# Patient Record
Sex: MALE | Race: WHITE
[De-identification: names, ages, dates, MRNs, and addresses within clinical notes are randomized per-mention and may not be internally consistent; named-entity substitution may affect disease eponyms.]

---

## 2018-12-17 NOTE — RAD
EXAM DESCRIPTION: Chest,2 Views



CLINICAL HISTORY: pre op



COMPARISON: Previous study October 8, 2013



TECHNIQUE: PA/lateral



FINDINGS: There is no acute appearing cardiac or pulmonary

abnormality. Heart size is normal with normal pulmonary

vascularity. No pleural effusion or pneumothorax. Lungs are clear

with no consolidating infiltrate. Lateral view shows intact

sternum and old wedging in the lower T-spine.



IMPRESSION:



No acute process is identified in the chest.



Electronically signed by:  Bradford Ramirez MD  12/17/2018 12:38

[facility] Workstation: [id number]

## 2018-12-21 ENCOUNTER — HOSPITAL ENCOUNTER (OUTPATIENT)
Dept: HOSPITAL 39 - AMB | Age: 73
Discharge: HOME | End: 2018-12-21
Attending: SURGERY
Payer: COMMERCIAL

## 2018-12-21 VITALS — TEMPERATURE: 96.7 F | OXYGEN SATURATION: 98 %

## 2018-12-21 VITALS — SYSTOLIC BLOOD PRESSURE: 158 MMHG | DIASTOLIC BLOOD PRESSURE: 80 MMHG

## 2018-12-21 DIAGNOSIS — K92.1: Primary | ICD-10-CM

## 2018-12-21 DIAGNOSIS — Z79.82: ICD-10-CM

## 2018-12-21 DIAGNOSIS — I10: ICD-10-CM

## 2018-12-21 DIAGNOSIS — K64.8: ICD-10-CM

## 2018-12-21 DIAGNOSIS — Z96.652: ICD-10-CM

## 2018-12-21 DIAGNOSIS — Z79.899: ICD-10-CM

## 2018-12-21 PROCEDURE — 46250 REMOVE EXT HEM GROUPS 2+: CPT

## 2018-12-21 PROCEDURE — 81001 URINALYSIS AUTO W/SCOPE: CPT

## 2018-12-21 PROCEDURE — 80053 COMPREHEN METABOLIC PANEL: CPT

## 2018-12-21 PROCEDURE — 45300 PROCTOSIGMOIDOSCOPY DX: CPT

## 2018-12-21 PROCEDURE — 93005 ELECTROCARDIOGRAM TRACING: CPT

## 2018-12-21 PROCEDURE — 36415 COLL VENOUS BLD VENIPUNCTURE: CPT

## 2018-12-21 PROCEDURE — 71046 X-RAY EXAM CHEST 2 VIEWS: CPT

## 2018-12-21 PROCEDURE — 85025 COMPLETE CBC W/AUTO DIFF WBC: CPT

## 2018-12-21 PROCEDURE — 00902 ANES ANORECTAL PX: CPT

## 2018-12-21 RX ADMIN — SODIUM CHLORIDE, POTASSIUM CHLORIDE, SODIUM LACTATE AND CALCIUM CHLORIDE ONE MLS: 600; 310; 30; 20 INJECTION, SOLUTION INTRAVENOUS at 10:05

## 2018-12-21 RX ADMIN — SODIUM CHLORIDE, POTASSIUM CHLORIDE, SODIUM LACTATE AND CALCIUM CHLORIDE ONE MLS: 600; 310; 30; 20 INJECTION, SOLUTION INTRAVENOUS at 12:24

## 2019-07-10 ENCOUNTER — HOSPITAL ENCOUNTER (OUTPATIENT)
Dept: HOSPITAL 39 - MRI | Age: 74
End: 2019-07-10
Attending: FAMILY MEDICINE
Payer: COMMERCIAL

## 2019-07-10 DIAGNOSIS — L03.116: Primary | ICD-10-CM

## 2019-07-10 NOTE — MRI
Study: MRI of the Left Foot. 



Indication: LEFT FOOT PAIN



Technique: Multiplanar, multi sequence MRI of the left foot was

obtained without intravenous contrast. 



Comparison: None.



Findings:



No acute fracture. Lisfranc ligament intact.  Scattered mild

osteoarthritis throughout the midfoot. Moderate osteoarthritis

first MTP joint. Cellulitis throughout the forefoot and most

pronounced at the second and third digits. A tiny subcutaneous

fluid collection noted along the lateral margin of the base of

the second digit anteriorly and measures up to 6.5 mm

craniocaudal by 4 mm transverse by 11 mm AP concerning for a tiny

abscess. No features of osteomyelitis.



Impression:



Cellulitis of the forefoot and most pronounced at the second and

third digits with a tiny subcutaneous abscess suspected of the

lateral margin of the base of the second digit. No features of

osteomyelitis identified.



Electronically signed by:  Yan Olvera MD  7/10/2019 1:37 PM CDT

Workstation: [id number]

## 2021-02-07 ENCOUNTER — HOSPITAL ENCOUNTER (INPATIENT)
Dept: HOSPITAL 39 - ER | Age: 76
LOS: 19 days | Discharge: ANOTHER HOSPITAL | DRG: 208 | End: 2021-02-26
Attending: NURSE PRACTITIONER | Admitting: NURSE PRACTITIONER
Payer: COMMERCIAL

## 2021-02-07 DIAGNOSIS — F05: ICD-10-CM

## 2021-02-07 DIAGNOSIS — E80.6: ICD-10-CM

## 2021-02-07 DIAGNOSIS — U07.1: Primary | ICD-10-CM

## 2021-02-07 DIAGNOSIS — I10: ICD-10-CM

## 2021-02-07 DIAGNOSIS — K81.9: ICD-10-CM

## 2021-02-07 DIAGNOSIS — J12.82: ICD-10-CM

## 2021-02-07 DIAGNOSIS — J96.01: ICD-10-CM

## 2021-02-07 DIAGNOSIS — R18.8: ICD-10-CM

## 2021-02-07 PROCEDURE — XW033E5 INTRODUCTION OF REMDESIVIR ANTI-INFECTIVE INTO PERIPHERAL VEIN, PERCUTANEOUS APPROACH, NEW TECHNOLOGY GROUP 5: ICD-10-PCS | Performed by: NURSE PRACTITIONER

## 2021-02-07 RX ADMIN — ALBUTEROL SULFATE SCH PUFF: 90 AEROSOL, METERED RESPIRATORY (INHALATION) at 17:30

## 2021-02-07 RX ADMIN — GUAIFENESIN SCH MG: 600 TABLET, EXTENDED RELEASE ORAL at 20:35

## 2021-02-07 RX ADMIN — ALBUTEROL SULFATE SCH PUFF: 90 AEROSOL, METERED RESPIRATORY (INHALATION) at 20:46

## 2021-02-07 RX ADMIN — ENOXAPARIN SODIUM SCH MG: 40 INJECTION, SOLUTION INTRAVENOUS; SUBCUTANEOUS at 20:35

## 2021-02-07 NOTE — RAD
: 1945.



Technique: Portable AP chest x-ray. Comparison: 2018.



Clinical history: SOB.



Heart size: Normal.

Lungs: Shallow inspiration. Linear densities at the lung bases

consistent with scarring or subsegmental atelectasis. No airspace

consolidation.

Pleura: No pleural effusion. No pneumothorax.

Mediastinum and ye: Unremarkable.

Skeletal: Unremarkable.

Support tubings: None.



Impression:

1.  Basilar atelectasis and shallow inspiration.



Electronically signed by:  Riley Hernandez MD  2021 12:51 PM

[facility] Workstation: [id number]

## 2021-02-07 NOTE — ED.PDOC
History of Present Illness





- General


Chief Complaint: Respiratory Problem


Stated Complaint: shortness of breath,cough, COVID+


Time Seen by Provider: 02/07/21 12:06


Source: patient, RN notes reviewed, Vital Signs reviewed, family


Exam Limitations: no limitations





- History of Present Illness


Initial Comments: 





76 yo male with PMH HTN and COVID+ presents with worsening SOB.  States he began

having cough and congestion on 1/28 and tested positive for COVID on 1/31 and 

treated with a Z pack.  States he has had body aches, fatigue, decreased 

appetite and occasional fever.  His breathing has been more difficult the past 2

days so came to ED for evaluation.  Denies CP or HA.  Reports cough productive 

of white mucous.


Allergies/Adverse Reactions: 


Allergies





NO KNOWN ALLERGY Allergy (Verified 12/22/15 09:21)


   





Home Medications: 


Ambulatory Orders





Amlodipine Besylate  02/07/21 











Review of Systems





- Review of Systems


Constitutional: States: fever, weakness - generalized.  Denies: chills


EENTM: States: nose congestion.  Denies: blurred vision, throat pain


Respiratory: States: cough, short of breath


Cardiology: Denies: chest pain, palpitations, syncope


Gastrointestinal/Abdominal: Denies: abdominal pain, nausea, vomiting


Genitourinary: Denies: dysuria, frequency


Musculoskeletal: States: muscle pain


Skin: Denies: rash


Neurological: Denies: headache, numbness


All other Systems: Reviewed and Negative





Past Medical History (General)





- Patient Medical History


Hx Stroke: No


Hx Cardiac Disorders: No


Hx Congestive Heart Failure: No


Hx Hypertension: Yes


Hx Diabetes: No


Hx MRSA: No





- Vaccination History


Hx Influenza Vaccination: No


Hx Pneumococcal Vaccination: No





- Social History


Hx Tobacco Use: No


Hx Alcohol Use:  - drinks, mostly weekends





Family Medical History





- Family History


  ** Mother


Family History: Unknown





Physical Exam





- Physical Exam


General Appearance: Alert, Comfortable, No apparent distress


Ears, Nose, Throat: normal pharynx


Neck: non-tender, full range of motion, supple


Respiratory: chest non-tender, normal breath sounds, no respiratory distress, no

accessory muscle use


Cardiovascular/Chest: regular rate, rhythm, no edema, no murmur


Gastrointestinal/Abdominal: non tender, soft, no pulsatile mass


Back Exam: no CVA tenderness, no vertebral tenderness


Extremity: normal range of motion, non-tender, normal inspection


Neurologic: no motor/sensory deficits, alert, normal mood/affect, oriented x 3


Skin Exam: normal color, warm/dry





Progress





- Progress


Progress: 





02/07/21 12:48


Lactic 2.8.  Will give small bolus IVF and repeat lactic.  Pt afebrile.  Resting

comfortably in no distress on 4L NC supplemental oxygen.








02/07/21 13:23


Pt has known COVID infection with worsening SOB past few days.  He is hypoxic on

RA and sats 93-94% on 4L NC.  I have d/w hospitalist, [place], and will 

admit for covid pneumonia.





- Results/Orders


Results/Orders: 





EKG- NSR, rate 61, nml intervals, no ST abnormality








CHEST Xray


Impression: 1. Basilar atelectasis and shallow inspiration.





                                        





02/07/21 12:09


Telemetry .ONCE 


Sodium Chloride 0.9% (Flush) [Saline Flush Syringe]   10 ml IV PRN PRN 


EKG Stat 


Pulse Ox Stat 





02/07/21 12:30


B-TYPE NATRIURETIC PEPTIDE/BNP Stat 


C-REACTIVE PROTEIN Stat 


LD-L/LDH Stat 





02/07/21 12:35


BLOOD CULTURE Stat 





02/07/21 12:48


Sodium Chloride 0.9% 1000ML [Ns 1000 ml] 1,000 ml IVS ONCE 





02/07/21 13:21


Azithromycin IV [Zithromax IV] 500 mg   Sodium Chloride 0.9% 250Ml [NS 250ml] 

250 ml IVPB ONCE 


Remdesivir 200 mg   Sodium Chloride 0.9% 250Ml [NS 250ml] 250 ml IVPB ONCE 


cefTRIAXone SODIUM [Rocephin] 1 gm   Sodium Chl 0.9% 50Ml Min-Bag+ [NS 50ml 

MINI-BAG+] 50 ml IVPB ONCE 





02/07/21 14:15


LACTIC ACID Q2H 








                         Laboratory Results - last 24 hr











  02/07/21 02/07/21 02/07/21





  12:30 12:30 12:31


 


WBC   


 


RBC   


 


Hgb   


 


Hct   


 


MCV   


 


MCH   


 


MCHC   


 


RDW   


 


Plt Count   


 


MPV   


 


Absolute Neuts (auto)   


 


Absolute Lymphs (auto)   


 


Absolute Monos (auto)   


 


Absolute Eos (auto)   


 


Absolute Basos (auto)   


 


Neutrophils %   


 


Lymphocytes %   


 


Monocytes %   


 


Eosinophils %   


 


Basophils %   


 


PT   


 


INR   


 


PTT (SP)   


 


Fibrinogen  546 H  


 


D-Dimer, Quantitative   


 


Sodium    138


 


Potassium    4.1


 


Chloride    101


 


Carbon Dioxide    26


 


Anion Gap    15.1


 


BUN    22 H


 


Creatinine    0.84


 


BUN/Creatinine Ratio    26.2 H


 


Random Glucose    95


 


Serum Osmolality    278.8


 


Lactic Acid   


 


Calcium    9.2


 


Total Bilirubin    1.8 H


 


AST    63 H


 


ALT    56


 


Alkaline Phosphatase    86


 


Creatine Kinase    37 L


 


CK-MB (CK-2)    0.9


 


CK-MB (CK-2) %    Not Reportable


 


Troponin I    < 0.02


 


B-Natriuretic Peptide   < 15.0 


 


Serum Total Protein    7.3


 


Albumin    3.4


 


Globulin    3.9 H


 


Albumin/Globulin Ratio    0.9 L














  02/07/21 02/07/21 02/07/21





  12:31 12:31 12:31


 


WBC  7.3  


 


RBC  5.95  


 


Hgb  18.1 H  


 


Hct  54.0 H  


 


MCV  90.7  


 


MCH  30.5  


 


MCHC  33.6  


 


RDW  15.0 H  


 


Plt Count  166  


 


MPV  9.1  


 


Absolute Neuts (auto)  5.40  


 


Absolute Lymphs (auto)  0.90 L  


 


Absolute Monos (auto)  1.00 H  


 


Absolute Eos (auto)  0.00  


 


Absolute Basos (auto)  0.00  


 


Neutrophils %  74.0  


 


Lymphocytes %  11.6 L  


 


Monocytes %  14.2 H  


 


Eosinophils %  0.1 L  


 


Basophils %  0.1  


 


PT   12.2 H 


 


INR   1.23 H 


 


PTT (SP)   28.5 


 


Fibrinogen   


 


D-Dimer, Quantitative   203.0 


 


Sodium   


 


Potassium   


 


Chloride   


 


Carbon Dioxide   


 


Anion Gap   


 


BUN   


 


Creatinine   


 


BUN/Creatinine Ratio   


 


Random Glucose   


 


Serum Osmolality   


 


Lactic Acid    2.8 H*


 


Calcium   


 


Total Bilirubin   


 


AST   


 


ALT   


 


Alkaline Phosphatase   


 


Creatine Kinase   


 


CK-MB (CK-2)   


 


CK-MB (CK-2) %   


 


Troponin I   


 


B-Natriuretic Peptide   


 


Serum Total Protein   


 


Albumin   


 


Globulin   


 


Albumin/Globulin Ratio   














Departure





- Departure


Clinical Impression: 


 Pneumonia due to COVID-19 virus, Hypoxia, Essential hypertension





Time of Disposition: 13:22


Disposition: Admit Patient


Condition: Fair


Departure Forms:  ED Discharge - Pt. Copy, Patient Portal Self Enrollment


Referrals: 


QUINTEN YUAN [Primary Care Provider] - 1-2 Weeks


Home Medications: 


Ambulatory Orders





Amlodipine Besylate  02/07/21 











Decision To Admit





- Decistion To Admit


Decision to Admit Date: 02/07/21


Decision to Admit Time: 13:22

## 2021-02-07 NOTE — HP
SUPERVISING PHYSICIAN:    Elias Benavides MD



CHIEF COMPLAINT:   Cough, shortness of breath, Covid positive.



HISTORY OF PRESENT ILLNESS:  Mr. Kim is a 75 year-old male patient who 
presented to the Emergency Room today with a history of hypertension and 
recently being tested positive for Covid.  He presents with increasing shortness
of breath.  He endorses that his symptoms initially started with a cough and 
congestion on 01/28/21.  He actually tested positive on 1/31/21 and was started 
on a Z-Pac at that time.  He also endorses that he has had body aches, fatigue, 
decreased appetite and occasional fever and increasing difficulty with breathing
over the last 2 days.  He denied actual chest pain.  His chest x-ray shows 
bilateral atelectasis.  His actual labs showed he had a white count of 7,300 
without a left shift but low lymphocytic count.  His D-dimer was normal but he 
had a lactic acid of 2.8.  Creatinine was 0.84.  C-reactive protein is a little 
low at 6.1. Vital signs in the Emergency Room showing saturation 88% on room 
air.  He does not normally wear oxygen.  Respirations of 24, some mild distress.
 After breathing treatment and placing him on oxygen at nasal cannula, he was 
showing 94% saturations.  Given his recent testing positive for Covid and 
increasing shortness of breath and dyspnea, hypoxia, he is going to be admitted 
now for treatment of Covid pneumonitis.  He was admitted in stable condition.



PAST MEDICAL HISTORY: 

1.  Hypertension.



PAST SURGICAL HISTORY:

1.  Left inguinal hernia.

2.  Eye surgery.

3.  Left total knee replacement. 



CURRENT MEDICATIONS:  Awaiting updated list and verification of his medications 
in the electronic medical record.



ALLERGIES:   No known drug allergies.



FAMILY HISTORY:   Positive for CVA, dementia.



SOCIAL HISTORY:  The patient currently works at [employer] in [place], [place].  
He drinks an occasional beer but has no history of smoking.  He is  and 
lives by himself.



REVIEW OF SYSTEMS: 

CONSTITUTIONAL:  Positive for general malaise, weakness, fevers.  Denies any 
actual chills or unintentional weight loss.

HEENT:  Positive for nasal congestion, denies ear ache, headaches. vision 
changes, sore throat.

CHEST:   Positive for cough and increasing shortness of breath.  

HEART:   Denies chest pain, palpitations, or syncopal episodes.  

ABDOMEN:   Negative for nausea, vomiting, diarrhea or constipation or abdominal 
pains.

GENITOURINARY:  Denies dysuria, hematuria or polyuria.

MUSCULOSKELETAL:  General arthralgias, no joint swelling.

SKIN:  Denies lesions, rashes, moles or unexplained changes.

NEUROLOGIC:  Denies ataxia, seizures, paraesthesias, headaches or other focal 
neurological deficits.

HEMATOLOGICAL:   Denies unexplained bleeding, easy bruising or transfusion 
reactions.



PHYSICAL EXAMINATION: 



VITAL SIGNS:  Temperature 98.3, pulse 74, blood pressure 150/84, respirations 24
to 26, oxygen saturation 88% on room air. After breathing treatment and placing 
on oxygen 4 liter nasal cannula, he was showing 94% saturations.



GENERAL:  The patient looks to be in no acute distress.  He is resting 
comfortably.  He is alert.



HEENT:  Tympanic membranes are clear bilaterally.  Oropharynx is pink, moist, no
lesions.



NECK:  Supple, non-tender, full range of motion.  No jugular venous distention.



CHEST:  Fairly clear, just diminished toward the bases, no obvious rhonchi, 
rales, or wheezes.



CARDIOVASCULAR:  Regular rate and rhythm without appreciable murmurs, rubs, or 
gallops.



ABDOMEN:   Soft, non-tender, positive bowel sounds



BACK:  No CVA tenderness, no vertebral tenderness.



EXTREMITIES:   Without cyanosis, clubbing, or edema.



NEUROLOGIC:  He is alert and oriented x 3.  Cranial nerves II through XII are 
grossly intact.



SKIN:  Warm, pink and dry.



RECTAL:   Exam deferred. 



LABORATORY:   White count 7,300, hemoglobin 18.1, hematocrit 54.0.  Platelet 
count 166,000.  Differential does show to be without a left shift but a low 
lymphocytic count.  Coagulation studies showed normal PTT.  D-dimer normal at 
203.  Fibrinogen slightly elevated at 546.  Chemistries show normal 
electrolytes, creatinine 0.4.  Initial lactic acid 2.8.  After a liter of fluid 
and repeat 2 hours, was 1.8.  Bilirubin slightly elevated at 1.8. AST up to 63, 
ALT normal, alkaline phosphatase normal.  LDH elevated at 292.  C-reactive 
protein 6.1.  Troponin less than 0.02.



MICROBIOLOGY:  Blood cultures are pending.



RADIOLOGY:  

12-lead EKG shows a normal sinus rhythm at 61 beats-per-minute with no ST or T-
wave changes to indicate acute ischemia.  



Chest x-ray showed bibasilar atelectasis and shallow inspiration.



ASSESSMENT: 

1.  Covid pneumonitis with associated hypoxia.

2.  Sepsis secondary to #1 with initial elevated lactic acid.

3.  Hypertension.



PLAN:   Mr. Kim is going to be admitted for treatment of Covid pneumonitis. 
He will be treated with Rocephin, azithromycin,  Remdesivir, Decadron.  He will 
be started on Protonix, Align and Mucinex and Lovenox for DVT prophylaxis.  He 
will have oxygen and keep his oxygen saturation at 94% or greater.  We will work
to titrate him down to room air as soon as possible.  He will have scheduled 
incentive spirometry with aggressive pulmonary hygiene, albuterol for breathing 
treatments scheduled and as needed.  I would anticipate his length of stay to be
at least 2 to 3 days. We will follow his labs and x-rays as per protocol.  Until
we can transition patient to outpatient management, we will continue to monitor 
and treat as needed



#[id number]

MTDD

## 2021-02-08 RX ADMIN — ALBUTEROL SULFATE SCH PUFF: 90 AEROSOL, METERED RESPIRATORY (INHALATION) at 20:50

## 2021-02-08 RX ADMIN — ALUMINUM ZIRCONIUM TRICHLOROHYDREX GLY SCH MG: 0.2 STICK TOPICAL at 08:52

## 2021-02-08 RX ADMIN — ALBUTEROL SULFATE SCH PUFF: 90 AEROSOL, METERED RESPIRATORY (INHALATION) at 08:40

## 2021-02-08 RX ADMIN — DEXAMETHASONE SODIUM PHOSPHATE SCH MG: 10 INJECTION INTRAMUSCULAR; INTRAVENOUS at 09:06

## 2021-02-08 RX ADMIN — ALBUTEROL SULFATE SCH PUFF: 90 AEROSOL, METERED RESPIRATORY (INHALATION) at 12:07

## 2021-02-08 RX ADMIN — CEFTRIAXONE SCH MLS/HR: 1 INJECTION, POWDER, FOR SOLUTION INTRAMUSCULAR; INTRAVENOUS at 19:26

## 2021-02-08 RX ADMIN — REMDESIVIR SCH MLS/HR: 5 INJECTION INTRAVENOUS at 11:27

## 2021-02-08 RX ADMIN — GUAIFENESIN SCH MG: 600 TABLET, EXTENDED RELEASE ORAL at 08:52

## 2021-02-08 RX ADMIN — GUAIFENESIN SCH MG: 600 TABLET, EXTENDED RELEASE ORAL at 20:08

## 2021-02-08 RX ADMIN — ENOXAPARIN SODIUM SCH MG: 40 INJECTION, SOLUTION INTRAVENOUS; SUBCUTANEOUS at 20:08

## 2021-02-08 RX ADMIN — AZITHROMYCIN DIHYDRATE SCH MLS/HR: 500 INJECTION, POWDER, LYOPHILIZED, FOR SOLUTION INTRAVENOUS at 09:06

## 2021-02-08 RX ADMIN — CEFTRIAXONE SCH MLS/HR: 1 INJECTION, POWDER, FOR SOLUTION INTRAMUSCULAR; INTRAVENOUS at 09:06

## 2021-02-08 RX ADMIN — ALBUTEROL SULFATE SCH PUFF: 90 AEROSOL, METERED RESPIRATORY (INHALATION) at 16:35

## 2021-02-08 NOTE — RAD
PROCEDURE:XR CHEST 1 VIEW



HISTORY:COVID 



COMPARISON:



February 7, 2021



FINDINGS:



The heart appears unremarkable. The patient again has taken a

poor inspiratory effort. There is some crowding the pulmonary

vascularity. There is no effusion or pneumothorax. There are

atelectatic changes seen at the left lung base. There are no

acute bony or soft tissue abnormalities.



IMPRESSION:



Stable chest x-ray.



Electronically signed by:  Rhys Bianchi MD  2/8/2021 6:23 AM CST

Workstation: [id number]

## 2021-02-08 NOTE — PN
SUPERVISING PHYSICIAN:  Brice Louise MD



DATE:  02/08/21



SUBJECTIVE:  The patient is not feeling any shortness of breath.  He does have a
little bit of increased shortness of breath as he walks around the room.  He 
does have a productive cough with white sputum.  



OBJECTIVE:  

VITAL SIGNS:  Blood pressure 116/67, heart rate 54, respiratory rate 18, 
temperature 97.2, oxygen saturation 95% on 5 liters via nasal cannula.

GENERAL:  Mr. Kim is a 75-year-old male patient who is in no active distress
currently.  

NEUROLOGIC:  The patient is alert.  

LUNGS:  Diminished with some bibasilar rales.

CARDIOVASCULAR:  Regular rate and rhythm.  Normal S1, S2.

ABDOMEN:  Soft.  Positive bowel sounds.  

EXTREMITIES: Lower extremities with no significant edema.  



LABORATORY:  White count 2.8, hemoglobin 15.5, platelet count 125.  D-dimer less
than 131.  Chemistry with mildly elevated BUN at 20.  Glucose 162.



RADIOLOGY:  Chest x-ray shows no significant change from yesterday.



ASSESSMENT: 

1.  Acute hypoxic respiratory failure secondary to COVID pneumonitis.

2.  COVID pneumonitis.

3.  Sepsis secondary to #1 with improved lactic acid.

4.  Hypertension.



PLAN:  We will continue the remdesivir, empiric antibiotics as well as 
dexamethasone.  Scheduled bronchodilator therapy as well as bronchial hygiene 
has been ordered as well.  We will continue to watch labs and x-rays as needed.



#[id number]

MTDD

## 2021-02-09 RX ADMIN — PROMETHAZINE HYDROCHLORIDE AND CODEINE PHOSPHATE PRN ML: 6.25; 1 SOLUTION ORAL at 20:55

## 2021-02-09 RX ADMIN — CEFTRIAXONE SCH MLS/HR: 1 INJECTION, POWDER, FOR SOLUTION INTRAMUSCULAR; INTRAVENOUS at 09:03

## 2021-02-09 RX ADMIN — DEXAMETHASONE SODIUM PHOSPHATE SCH MG: 10 INJECTION INTRAMUSCULAR; INTRAVENOUS at 09:03

## 2021-02-09 RX ADMIN — REMDESIVIR SCH MLS/HR: 5 INJECTION INTRAVENOUS at 11:17

## 2021-02-09 RX ADMIN — GUAIFENESIN SCH MG: 600 TABLET, EXTENDED RELEASE ORAL at 20:55

## 2021-02-09 RX ADMIN — ALBUTEROL SULFATE SCH PUFF: 90 AEROSOL, METERED RESPIRATORY (INHALATION) at 16:30

## 2021-02-09 RX ADMIN — PANTOPRAZOLE SODIUM SCH MG: 40 TABLET, DELAYED RELEASE ORAL at 05:40

## 2021-02-09 RX ADMIN — ALBUTEROL SULFATE SCH PUFF: 90 AEROSOL, METERED RESPIRATORY (INHALATION) at 08:15

## 2021-02-09 RX ADMIN — ALBUTEROL SULFATE SCH PUFF: 90 AEROSOL, METERED RESPIRATORY (INHALATION) at 12:30

## 2021-02-09 RX ADMIN — ALUMINUM ZIRCONIUM TRICHLOROHYDREX GLY SCH MG: 0.2 STICK TOPICAL at 09:04

## 2021-02-09 RX ADMIN — AZITHROMYCIN DIHYDRATE SCH MLS/HR: 500 INJECTION, POWDER, LYOPHILIZED, FOR SOLUTION INTRAVENOUS at 09:04

## 2021-02-09 RX ADMIN — GUAIFENESIN SCH MG: 600 TABLET, EXTENDED RELEASE ORAL at 09:04

## 2021-02-09 RX ADMIN — DEXAMETHASONE SODIUM PHOSPHATE SCH MG: 10 INJECTION INTRAMUSCULAR; INTRAVENOUS at 20:54

## 2021-02-09 RX ADMIN — ENOXAPARIN SODIUM SCH MG: 40 INJECTION, SOLUTION INTRAVENOUS; SUBCUTANEOUS at 20:54

## 2021-02-09 RX ADMIN — ALBUTEROL SULFATE SCH PUFF: 90 AEROSOL, METERED RESPIRATORY (INHALATION) at 20:48

## 2021-02-09 RX ADMIN — CEFTRIAXONE SCH MLS/HR: 1 INJECTION, POWDER, FOR SOLUTION INTRAMUSCULAR; INTRAVENOUS at 20:53

## 2021-02-09 RX ADMIN — ACETAMINOPHEN PRN MG: 325 TABLET ORAL at 09:30

## 2021-02-09 NOTE — PN
SUPERVISING PHYSICIAN:  Brice Louise MD



DATE:  02/09/21



SUBJECTIVE:  The patient states he feels okay.  He gets a little bit short of 
breath with movement, but overall slept pretty decently las night.



OBJECTIVE:  

VITAL SIGNS:  Blood pressure 133/71, heart rate 60, respiratory rate 18, 
temperature 97.6, oxygen saturation 91% on 7 liters via nasal cannula.

GENERAL:  Mr. Kim is a 75-year-old male patient who is in no active distress
currently.  

NEUROLOGIC:  The patient is alert and oriented.

LUNGS:  Diminished.

CARDIOVASCULAR:  Regular rate and rhythm.  Normal S1, S2.

ABDOMEN:  Soft.  Positive bowel sounds.  

EXTREMITIES: Lower extremities with no edema.  



LABORATORY:  White count 7.7, hemoglobin 15.0, platelet count 133.  D-dimer less
than 131.  Chemistry with CRP down to 1.8.



RADIOLOGY:  Chest x-ray with mild diffuse increase in opacities.



ASSESSMENT: 

1.  Acute hypoxemic respiratory failure.

2.  COVID pneumonitis.

3.  Hypertension.



PLAN:  We will continue the remdesivir, empiric antibiotics as well as 
dexamethasone.  I am actually going to increase the dexamethasone to b.i.d. due 
to increased O2 requirements.  We will continue bronchodilator therapy as well 
as bronchial hygiene.  We will continue to monitor labs and x-rays as needed.



#[id number]

[facility]

## 2021-02-09 NOTE — RAD
EXAM DESCRIPTION:

Chest,1 View



CLINICAL HISTORY:

75 years Male, COVID



COMPARISON:

February 8, 2021



TECHNIQUE:

AP portable chest.



FINDINGS:

 

Extremely small lung volumes with worsening peripheral hazy

infiltrates with relative sparing of the infrahilar and perihilar

region on the right noted. Definite deterioration from portable

previous day's study noted. No associated pleural effusions.

Heart size normal allowing for poor inspiration. Tortuous aorta.

An area of more dense consolidation at the lateral left lung base

is cleared since previous study.



IMPRESSION:

Overall deterioration of the chest with hazy peripheral

infiltrates typical of a Covid pneumonitis with relative sparing

of the central right chest. An area of more dense consolidation

at the lateral left lung base has cleared since previous day's

study.



Electronically signed by:  Geoff Felix MD  2/9/2021 7:32 AM

[facility] Workstation: [id number]

## 2021-02-10 RX ADMIN — ALBUTEROL SULFATE SCH PUFF: 90 AEROSOL, METERED RESPIRATORY (INHALATION) at 08:00

## 2021-02-10 RX ADMIN — CEFTRIAXONE SCH MLS/HR: 1 INJECTION, POWDER, FOR SOLUTION INTRAMUSCULAR; INTRAVENOUS at 09:21

## 2021-02-10 RX ADMIN — PANTOPRAZOLE SODIUM SCH MG: 40 TABLET, DELAYED RELEASE ORAL at 05:37

## 2021-02-10 RX ADMIN — GUAIFENESIN SCH MG: 600 TABLET, EXTENDED RELEASE ORAL at 09:21

## 2021-02-10 RX ADMIN — ENOXAPARIN SODIUM SCH MG: 40 INJECTION, SOLUTION INTRAVENOUS; SUBCUTANEOUS at 20:24

## 2021-02-10 RX ADMIN — REMDESIVIR SCH MLS/HR: 5 INJECTION INTRAVENOUS at 12:08

## 2021-02-10 RX ADMIN — DEXAMETHASONE SODIUM PHOSPHATE SCH MG: 10 INJECTION INTRAMUSCULAR; INTRAVENOUS at 20:24

## 2021-02-10 RX ADMIN — CEFTRIAXONE SCH MLS/HR: 1 INJECTION, POWDER, FOR SOLUTION INTRAMUSCULAR; INTRAVENOUS at 19:46

## 2021-02-10 RX ADMIN — ALBUTEROL SULFATE SCH: 90 AEROSOL, METERED RESPIRATORY (INHALATION) at 13:20

## 2021-02-10 RX ADMIN — ALUMINUM ZIRCONIUM TRICHLOROHYDREX GLY SCH MG: 0.2 STICK TOPICAL at 09:21

## 2021-02-10 RX ADMIN — ALBUTEROL SULFATE SCH PUFF: 90 AEROSOL, METERED RESPIRATORY (INHALATION) at 12:50

## 2021-02-10 RX ADMIN — GUAIFENESIN SCH MG: 600 TABLET, EXTENDED RELEASE ORAL at 20:24

## 2021-02-10 RX ADMIN — AZITHROMYCIN DIHYDRATE SCH MLS/HR: 500 INJECTION, POWDER, LYOPHILIZED, FOR SOLUTION INTRAVENOUS at 09:21

## 2021-02-10 RX ADMIN — DEXAMETHASONE SODIUM PHOSPHATE SCH MG: 10 INJECTION INTRAMUSCULAR; INTRAVENOUS at 09:22

## 2021-02-10 RX ADMIN — ALBUTEROL SULFATE SCH PUFF: 90 AEROSOL, METERED RESPIRATORY (INHALATION) at 19:10

## 2021-02-10 NOTE — RAD
PROCEDURE:XR CHEST 1 VIEW



HISTORY:COVID 



COMPARISON:



February 9, 2021



FINDINGS:



The heart is stable in appearance.. There are stable infiltrates.

There are no new infiltrates. There is no evidence for effusion

or pneumothorax. There are no acute bony or soft tissue

abnormalities.



IMPRESSION:



Stable chest x-ray.



Electronically signed by:  Rhys Bianchi MD  2/10/2021 7:12 AM CST

Workstation: [id number]

## 2021-02-10 NOTE — PN
SUPERVISING PHYSICIAN:  Brice Louise MD



DATE:  02/10/21



SUBJECTIVE:  The patient is resting on his left side.  He does not appear to be 
in any distress.  He is just mildly tachypneic.  Otherwise, he is doing okay.  
He has not had any chest pain.  He says his shortness of breath is still there, 
but not as bad.  



OBJECTIVE:  

VITAL SIGNS:  Temperature 98.6, pulse 52, blood pressure 143/83, respirations 
16, saturation anywhere from 89% to 94% on high flow nasal cannula anywhere from
5 to 8 liters.  

GENERAL:  The patient is resting comfortably on his left side.  He does not 
appear to be in any distress.  He is alert.

CHEST:  Right lung is fairly clear, just diminished toward the base.  Left lung 
has fairly prominent rhonchi heard, more so in upper lung field, more prominent 
on lateral aspect.  No obvious wheezing is noted.  No rales.

HEART:  Regular rate and rhythm.  

ABDOMEN:  Soft, nontender.  Positive bowel sounds. 

EXTREMITIES:  No edema.  

NEUROLOGIC: Alert and oriented times three.  



LABORATORY:  CBC fairly stable with white count 6,500, hemoglobin 15.1, 
hematocrit 45.0, platelet count 135,000.  Differential continues to show a left 
shift.  Coagulation studies show D-dimer normalized now to less than 131.  
Chemistries show creatinine 0.5.  His last C-reactive protein was 1.8.  



RADIOLOGY:  Repeat chest x-ray this morning per radiologic interpretation of 
single view chest shows x-rays to be stable with infiltrates noted, no new 
infiltrates, no effusion or pneumothorax.



ASSESSMENT: 

1.  Acute hypoxemic respiratory failure.

2.  COVID pneumonitis.

3.  Hypertension.



PLAN:  We will continue with current plan of care with antibiotics, remdesivir 
and Decadron.  We will continue to titrate his oxygen as he tolerates to lower 
flow.  He does remain on Decadron at b.i.d. which seems to be helping.  I have 
encouraged him to self prone if possible and to lay on his side to help with 
oxygenation.  Again, hopefully we will be able to get his oxygen needs down to 
at least 4 liters stably.  Until that point, we will continue to monitor and 
treat as needed.  



#[id number]

[facility]

## 2021-02-11 RX ADMIN — CEFTRIAXONE SCH MLS/HR: 1 INJECTION, POWDER, FOR SOLUTION INTRAMUSCULAR; INTRAVENOUS at 20:04

## 2021-02-11 RX ADMIN — ALBUTEROL SULFATE SCH PUFF: 90 AEROSOL, METERED RESPIRATORY (INHALATION) at 08:20

## 2021-02-11 RX ADMIN — GUAIFENESIN SCH MG: 600 TABLET, EXTENDED RELEASE ORAL at 20:32

## 2021-02-11 RX ADMIN — DEXAMETHASONE SODIUM PHOSPHATE SCH MG: 10 INJECTION INTRAMUSCULAR; INTRAVENOUS at 20:32

## 2021-02-11 RX ADMIN — AZITHROMYCIN DIHYDRATE SCH MLS/HR: 500 INJECTION, POWDER, LYOPHILIZED, FOR SOLUTION INTRAVENOUS at 09:47

## 2021-02-11 RX ADMIN — CEFTRIAXONE SCH MLS/HR: 1 INJECTION, POWDER, FOR SOLUTION INTRAMUSCULAR; INTRAVENOUS at 09:47

## 2021-02-11 RX ADMIN — REMDESIVIR SCH MLS/HR: 5 INJECTION INTRAVENOUS at 12:16

## 2021-02-11 RX ADMIN — PANTOPRAZOLE SODIUM SCH MG: 40 TABLET, DELAYED RELEASE ORAL at 05:48

## 2021-02-11 RX ADMIN — DEXAMETHASONE SODIUM PHOSPHATE SCH MG: 10 INJECTION INTRAMUSCULAR; INTRAVENOUS at 09:48

## 2021-02-11 RX ADMIN — ENOXAPARIN SODIUM SCH MG: 40 INJECTION, SOLUTION INTRAVENOUS; SUBCUTANEOUS at 20:31

## 2021-02-11 RX ADMIN — ALUMINUM ZIRCONIUM TRICHLOROHYDREX GLY SCH MG: 0.2 STICK TOPICAL at 09:48

## 2021-02-11 RX ADMIN — ALBUTEROL SULFATE SCH PUFF: 90 AEROSOL, METERED RESPIRATORY (INHALATION) at 19:45

## 2021-02-11 RX ADMIN — ALBUTEROL SULFATE SCH PUFF: 90 AEROSOL, METERED RESPIRATORY (INHALATION) at 11:55

## 2021-02-11 RX ADMIN — ALBUTEROL SULFATE SCH PUFF: 90 AEROSOL, METERED RESPIRATORY (INHALATION) at 16:20

## 2021-02-11 RX ADMIN — GUAIFENESIN SCH MG: 600 TABLET, EXTENDED RELEASE ORAL at 09:48

## 2021-02-12 RX ADMIN — ALBUTEROL SULFATE SCH PUFF: 90 AEROSOL, METERED RESPIRATORY (INHALATION) at 09:00

## 2021-02-12 RX ADMIN — PANTOPRAZOLE SODIUM SCH MG: 40 TABLET, DELAYED RELEASE ORAL at 05:56

## 2021-02-12 RX ADMIN — ENOXAPARIN SODIUM SCH MG: 40 INJECTION, SOLUTION INTRAVENOUS; SUBCUTANEOUS at 20:46

## 2021-02-12 RX ADMIN — DEXAMETHASONE SODIUM PHOSPHATE SCH MG: 10 INJECTION INTRAMUSCULAR; INTRAVENOUS at 20:46

## 2021-02-12 RX ADMIN — GUAIFENESIN SCH MG: 600 TABLET, EXTENDED RELEASE ORAL at 20:46

## 2021-02-12 RX ADMIN — CEFTRIAXONE SCH MLS/HR: 1 INJECTION, POWDER, FOR SOLUTION INTRAMUSCULAR; INTRAVENOUS at 08:38

## 2021-02-12 RX ADMIN — ALUMINUM ZIRCONIUM TRICHLOROHYDREX GLY SCH MG: 0.2 STICK TOPICAL at 08:38

## 2021-02-12 RX ADMIN — GUAIFENESIN SCH MG: 600 TABLET, EXTENDED RELEASE ORAL at 08:38

## 2021-02-12 RX ADMIN — ALBUTEROL SULFATE SCH PUFF: 90 AEROSOL, METERED RESPIRATORY (INHALATION) at 13:00

## 2021-02-12 RX ADMIN — PROMETHAZINE HYDROCHLORIDE AND CODEINE PHOSPHATE PRN ML: 6.25; 1 SOLUTION ORAL at 20:10

## 2021-02-12 RX ADMIN — ALBUTEROL SULFATE SCH PUFF: 90 AEROSOL, METERED RESPIRATORY (INHALATION) at 16:45

## 2021-02-12 RX ADMIN — ALBUTEROL SULFATE SCH PUFF: 90 AEROSOL, METERED RESPIRATORY (INHALATION) at 20:37

## 2021-02-12 RX ADMIN — DEXAMETHASONE SODIUM PHOSPHATE SCH MG: 10 INJECTION INTRAMUSCULAR; INTRAVENOUS at 08:38

## 2021-02-12 RX ADMIN — LEVOFLOXACIN SCH MLS/HR: 5 INJECTION, SOLUTION INTRAVENOUS at 10:08

## 2021-02-12 NOTE — PN
SUPERVISING PHYSICIAN:  Brice Louise MD



DATE:  02/11/21



SUBJECTIVE:  The patient has been on the Airvo high flow nasal cannula system 
and seems to be doing okay.  He is still having some obvious shortness of 
breath, but is in no distress.  No chest pains reported.  He has had several 
bowel movements, no abdominal pains.  He does remain afebrile.



OBJECTIVE:  

VITAL SIGNS:  Temperature 97.8, pulse 62, blood pressure 128/73, respirations 18
to 20, saturation 95% on high flow nasal cannula.

GENERAL:  The patient is laying in bed currently utilizing the Airvo high flow 
nasal cannula system.  He is in no distress, he is alert.  

CHEST:  Left side chest continues to have a significant amount of rhonchi heard,
more prominent in upper lobe and to the lateral aspect.  No wheezing or rales 
noted.  Right side sounds fairly clear, just diminished toward the base. 

HEART:  Regular rate and rhythm.  

ABDOMEN:  Soft, nontender.  Positive bowel sounds. 

EXTREMITIES:  No edema.  

NEUROLOGIC: Alert and oriented times three.  



LABORATORY:  No additional lab studies today as his labs have been fairly 
stable.



RADIOLOGY:  No repeat chest x-ray today, that has been stable as well.



ASSESSMENT: 

1.  Acute hypoxemic respiratory failure.

2.  COVID pneumonitis.

3.  Hypertension.



PLAN:  We will continue with Rocephin, dexamethasone, Lovenox.  He has finished 
a full course of azithromycin.  He remains on Protonix and Align.  We will 
continue to do aggressive pulmonary hygiene with albuterol treatments as needed 
and scheduled.  We will continue to work to titrate his oxygen needs down as he 
tolerates.  Until that point, we will continue to monitor and treat as needed 
until he can transition to outpatient management. 



#[id number]

[facility]

## 2021-02-12 NOTE — RAD
EXAM: XR CHEST 1 VIEW



HISTORY: 75 years, Male, COVID PNA



TECHNIQUE: Chest,1 View



COMPARISON: February 10, 2021



FINDINGS:

 

Again seen are bilateral infiltrates with interspersed airspace

opacities greatest in the left perihilar region and left lateral

mid lung. Infectious process suspected.



Stable mediastinal cardiac silhouette.



No effusions.



IMPRESSION:

 

Bilateral infiltrates and airspace opacities,  more prominent in

the left lateral mid lung and left perihilar region.



Electronically signed by:  Carolina White MD  2/12/2021 6:03 AM

[facility] Workstation: [id number]

## 2021-02-12 NOTE — PN
SUPERVISING PHYSICIAN:  Brice Louise MD



DATE:  02/12/21



SUBJECTIVE:  The patient remains on high flow nasal cannula via the Airvo 
system.  he has not made any significant strides to decrease his oxygen needs 
overnight, but he is remaining stable.  He has had no chest pain, no nausea or 
vomiting.  We did discuss his code status and he does wish to continue with a 
full code and intubation if needed.  He remains afebrile.



OBJECTIVE:  

VITAL SIGNS:  Temperature 97.6, pulse 61, blood pressure 135/78, respirations 
18, saturation 90-91% on high flow nasal cannula via Airvo system.  

GENERAL:  The patient is laying in bed currently utilizing the Airvo high flow 
nasal cannula system.  He is in no distress, he is alert.  

CHEST:  Left chest continues to have mild rhonchi heard, not as prominent as 
yesterday, but continues to be more prominent in upper lobe in the lateral 
aspect.  I am not hearing any wheezing or rales.  There seems to be much better 
air movement today compared to yesterday on the right and continues to sound 
fairly clear.

HEART:  Regular rate and rhythm.  

ABDOMEN:  Soft, nontender.  Positive bowel sounds. 

EXTREMITIES:  No edema.  

NEUROLOGIC: Alert and oriented times three.  



LABORATORY:  Chemistries show normal electrolytes.  Creatinine 0.61, calcium 
8.6.  C-reactive protein is now normalized.  



RADIOLOGY:  Repeat chest x-ray today per radiologic interpretation shows 
bilateral infiltrates and interstitial opacities, more prominent in the left 
lateral midlung and left perihilar region.  



ASSESSMENT: 

1.  Acute hypoxemic respiratory failure.

2.  COVID pneumonitis.

3.  Hypertension.



PLAN:  He has finished his antibiotics in the form of Rocephin and azithromycin.
 Given that he still having significant rhonchi and consolidation in the left 
upper lobe and has not made any significant progress in the last 24 hours, I am 
going to escalate his coverage with antibiotics with Levaquin.  We will start 
him on Levaquin 750 mg with anticipation of 5 days.  He remains on Protonix, 
Align, Lovenox and dexamethasone.  He continues to have aggressive pulmonary 
hygiene with albuterol treatments as needed and remains on Airvo system.  We 
will continue to work to titrate his oxygen needs to room as he tolerates, but 
it has been slow progress.  Until the patient can transition to outpatient 
management, we will continue to monitor and treat as needed.  



#[id number]

[facility]

## 2021-02-13 RX ADMIN — GUAIFENESIN SCH MG: 600 TABLET, EXTENDED RELEASE ORAL at 20:38

## 2021-02-13 RX ADMIN — ALBUTEROL SULFATE SCH PUFF: 90 AEROSOL, METERED RESPIRATORY (INHALATION) at 08:30

## 2021-02-13 RX ADMIN — ALBUTEROL SULFATE SCH PUFF: 90 AEROSOL, METERED RESPIRATORY (INHALATION) at 16:25

## 2021-02-13 RX ADMIN — GUAIFENESIN SCH MG: 600 TABLET, EXTENDED RELEASE ORAL at 08:23

## 2021-02-13 RX ADMIN — PROMETHAZINE HYDROCHLORIDE AND CODEINE PHOSPHATE PRN ML: 6.25; 1 SOLUTION ORAL at 21:25

## 2021-02-13 RX ADMIN — ALBUTEROL SULFATE SCH PUFF: 90 AEROSOL, METERED RESPIRATORY (INHALATION) at 20:05

## 2021-02-13 RX ADMIN — ENOXAPARIN SODIUM SCH MG: 40 INJECTION, SOLUTION INTRAVENOUS; SUBCUTANEOUS at 20:38

## 2021-02-13 RX ADMIN — ALBUTEROL SULFATE SCH PUFF: 90 AEROSOL, METERED RESPIRATORY (INHALATION) at 12:48

## 2021-02-13 RX ADMIN — LEVOFLOXACIN SCH MLS/HR: 5 INJECTION, SOLUTION INTRAVENOUS at 09:37

## 2021-02-13 RX ADMIN — PANTOPRAZOLE SODIUM SCH MG: 40 TABLET, DELAYED RELEASE ORAL at 06:05

## 2021-02-13 RX ADMIN — ALUMINUM ZIRCONIUM TRICHLOROHYDREX GLY SCH MG: 0.2 STICK TOPICAL at 08:23

## 2021-02-13 NOTE — PN
SUPERVISING PHYSICIAN:  Brice Louise MD



DATE:  02/13/21



SUBJECTIVE:  The patient seems to be doing fairly well.  He has no major 
complaints.  No chest pain, no nausea.  Still having some shortness of breath, 
especially with ambulation.  He seems to be doing okay on the high flow nasal 
cannula via the Airvo system.  He is actually showing some decrease in need for 
FI02.  Otherwise, he appears to be fairly stable. 



OBJECTIVE:  

VITAL SIGNS:  Temperature 97.6, pulse 49, blood pressure 138/79, respirations 
22, saturation 95% on high flow nasal cannula and FI02 with Airvo system.  

GENERAL:  The patient is laying in bed currently utilizing the Airvo high flow 
nasal cannula system.  He is in no distress, he is alert.  

CHEST:  Left chest continues to have mild rhonchi heard, not as prominent as 
yesterday, but continues to be more prominent in upper lobe in the lateral 
aspect.  I am not hearing any wheezing or rales.  There seems to be much better 
air movement today compared to yesterday on the right and continues to sound 
fairly clear.

HEART:  Regular rate and rhythm.  

ABDOMEN:  Soft, nontender.  Positive bowel sounds. 

EXTREMITIES:  No edema.  

NEUROLOGIC: Alert and oriented times three.  



LABORATORY:  No additional laboratory studies today.  His lab has been fairly 
stable. C-reactive protein is normalized as well as his D-dimer.



MICROBIOLOGY:  Blood cultures remain negative after 5 days. 



RADIOLOGY:  No repeat chest x-ray today as his chest x-ray has been fairly 
stable.



ASSESSMENT: 

1.  Acute hypoxemic respiratory failure.

2.  COVID pneumonitis.

3.  Hypertension.



PLAN:  We will continue current plan of care.  He is now on antibiotic coverage 
with Levaquin.  He has finished a full course of Decadron.  We may go ahead and 
put him back on low-dose prednisone today to see if this will at least assist in
decrease of his oxygen needs.  He remains on Lovenox for DVT prophylaxis, 
Protonix.  We will continue to titrate his oxygen down as possibly and hopefully
get him down to at least lower flow nasal cannula over the next 24-48 ours.  
Until then, we will continue to monitor and treat as needed until we can 
transition him to outpatient management.



#[id number]

[facility]

## 2021-02-14 RX ADMIN — ALBUTEROL SULFATE SCH PUFF: 90 AEROSOL, METERED RESPIRATORY (INHALATION) at 08:40

## 2021-02-14 RX ADMIN — ENOXAPARIN SODIUM SCH MG: 40 INJECTION, SOLUTION INTRAVENOUS; SUBCUTANEOUS at 20:30

## 2021-02-14 RX ADMIN — PANTOPRAZOLE SODIUM SCH MG: 40 TABLET, DELAYED RELEASE ORAL at 06:08

## 2021-02-14 RX ADMIN — ALBUTEROL SULFATE SCH PUFF: 90 AEROSOL, METERED RESPIRATORY (INHALATION) at 11:50

## 2021-02-14 RX ADMIN — GUAIFENESIN SCH: 600 TABLET, EXTENDED RELEASE ORAL at 11:17

## 2021-02-14 RX ADMIN — ALBUTEROL SULFATE SCH PUFF: 90 AEROSOL, METERED RESPIRATORY (INHALATION) at 17:45

## 2021-02-14 RX ADMIN — LEVOFLOXACIN SCH MLS/HR: 5 INJECTION, SOLUTION INTRAVENOUS at 09:37

## 2021-02-14 RX ADMIN — GUAIFENESIN SCH MG: 600 TABLET, EXTENDED RELEASE ORAL at 20:30

## 2021-02-14 RX ADMIN — ALBUTEROL SULFATE SCH PUFF: 90 AEROSOL, METERED RESPIRATORY (INHALATION) at 19:30

## 2021-02-14 RX ADMIN — PROMETHAZINE HYDROCHLORIDE AND CODEINE PHOSPHATE PRN ML: 6.25; 1 SOLUTION ORAL at 20:30

## 2021-02-14 RX ADMIN — ALUMINUM ZIRCONIUM TRICHLOROHYDREX GLY SCH: 0.2 STICK TOPICAL at 11:16

## 2021-02-14 NOTE — RAD
PROCEDURE:XR CHEST 1 VIEW



HISTORY:COVID PNA 



COMPARISON:



February 12, 2021



FINDINGS:



The heart appears unremarkable. There are stable infiltrates.

There are no new infiltrates. There is no evidence for effusion

or pneumothorax. There are no acute bony or soft tissue

abnormalities.



IMPRESSION:



Stable chest x-ray.



Electronically signed by:  Rhys Bianchi MD  2/14/2021 8:58 AM CST

Workstation: [id number]

## 2021-02-14 NOTE — PN
SUPERVISING PHYSICIAN: Brice Louise MD



DATE: 02/14/21



SUBJECTIVE:  The patient is sitting up in bed.  He has his Airvo on.  Earlier 
today, we tried the BiPAP and he was unable to tolerate it.  We had a long 
discussion about the need for BiPAP and he did say he would attempt to use it if
he could.



OBJECTIVE:  

VITAL SIGNS:  Temperature 97.6, heart rate 82, blood pressure 140/81, 
respiratory rate 22, O2 saturation 91% on high flow Airvo.

RESPIRATORY:  Diminished throughout.

CARDIAC: Regular rate and rhythm.  

NEUROLOGIC: Awake, alert and oriented times three.  



LABORATORY:  WBCs 13,200, hemoglobin 16.9, hematocrit 50.  He has a left shift 
on differential.  D-dimer less than 131, fibrinogen 420.  Electrolytes are 
basically within normal limits.  



RADIOLOGY: Chest x-ray shows stable chest x-ray.  All other labs and films have 
been reviewed via the EMR.  



ASSESSMENT: 

1.  Acute hypoxemic respiratory failure.

2.  COVID pneumonitis.

3.  Hypertension.



PLAN:  We will continue present supportive care.  I have given him some Ativan 
to help him tolerate BiPAP as needed and we will continue to titrate his oxygen 
as indicated by his clinical presentation.  I ordered lab for tomorrow.  I will 
hold on a chest x-ray for now.  Continue aggressive pulmonary hygiene.  



#[id number]

[facility]

## 2021-02-15 RX ADMIN — IPRATROPIUM BROMIDE AND ALBUTEROL SULFATE SCH ML: .5; 3 SOLUTION RESPIRATORY (INHALATION) at 16:08

## 2021-02-15 RX ADMIN — GUAIFENESIN SCH MG: 600 TABLET, EXTENDED RELEASE ORAL at 20:08

## 2021-02-15 RX ADMIN — ALBUTEROL SULFATE SCH PUFF: 90 AEROSOL, METERED RESPIRATORY (INHALATION) at 07:30

## 2021-02-15 RX ADMIN — LEVOFLOXACIN SCH MLS/HR: 5 INJECTION, SOLUTION INTRAVENOUS at 08:53

## 2021-02-15 RX ADMIN — GUAIFENESIN SCH MG: 600 TABLET, EXTENDED RELEASE ORAL at 08:53

## 2021-02-15 RX ADMIN — PANTOPRAZOLE SODIUM SCH: 40 TABLET, DELAYED RELEASE ORAL at 06:18

## 2021-02-15 RX ADMIN — ALUMINUM ZIRCONIUM TRICHLOROHYDREX GLY SCH MG: 0.2 STICK TOPICAL at 08:53

## 2021-02-15 RX ADMIN — ACETAMINOPHEN PRN MG: 325 TABLET ORAL at 23:40

## 2021-02-15 RX ADMIN — IPRATROPIUM BROMIDE AND ALBUTEROL SULFATE SCH ML: .5; 3 SOLUTION RESPIRATORY (INHALATION) at 21:50

## 2021-02-15 RX ADMIN — ALBUTEROL SULFATE SCH PUFF: 90 AEROSOL, METERED RESPIRATORY (INHALATION) at 13:07

## 2021-02-15 RX ADMIN — ENOXAPARIN SODIUM SCH MG: 40 INJECTION, SOLUTION INTRAVENOUS; SUBCUTANEOUS at 20:08

## 2021-02-15 NOTE — PN
SUPERVISING PHYSICIAN:  Geoff Jiménez MD



DATE: 02/15/21



SUBJECTIVE:  The patient is sitting up in bed.  He has BiPAP on.  Nursing said 
he is tolerating it better today.  The patient denies chest pain, nausea and 
vomiting.  He does admit he continues to be anxious.



OBJECTIVE:  

VITAL SIGNS:  Temperature 97.5, heart rate 74, blood pressure 130/74, 
respiratory rate 21, O2 saturation 91% on 50% BiPAP.  

RESPIRATORY:  Diminished throughout.

CARDIAC: Regular rate and rhythm.  

NEUROLOGIC: Awake, alert and oriented times three.  



LABORATORY:  WBCs 13,700, hemoglobin 17.1, hematocrit 49.7  He has a left shift 
on differential.  D-dimer 448.  Electrolytes are basically within normal limits.
 Bilirubin 3.5, direct bilirubin 0.6, indirect bilirubin 2.9.  C-reactive 
protein 6.1. All other labs and films have been reviewed via the EMR.  



ASSESSMENT: 

1.  Acute hypoxemic respiratory failure.

2.  COVID pneumonitis.

3.  Hypertension.

4.  Hyperbilirubinemia.



PLAN:  We will continue present supportive care.  We will continue COVID 
guidelines.  We will continue with aggressive pulmonary hygiene.  I have ordered
lab for in the morning as well as CT scan.  I changed his inhaler to breathing 
treatments for more aggressive pulmonary hygiene.   



#[id number]

[facility]

## 2021-02-16 RX ADMIN — LEVOFLOXACIN SCH MLS/HR: 5 INJECTION, SOLUTION INTRAVENOUS at 09:19

## 2021-02-16 RX ADMIN — GUAIFENESIN SCH MG: 600 TABLET, EXTENDED RELEASE ORAL at 09:19

## 2021-02-16 RX ADMIN — IPRATROPIUM BROMIDE AND ALBUTEROL SULFATE SCH ML: .5; 3 SOLUTION RESPIRATORY (INHALATION) at 09:40

## 2021-02-16 RX ADMIN — ENOXAPARIN SODIUM SCH MG: 40 INJECTION, SOLUTION INTRAVENOUS; SUBCUTANEOUS at 20:18

## 2021-02-16 RX ADMIN — PANTOPRAZOLE SODIUM SCH MG: 40 TABLET, DELAYED RELEASE ORAL at 05:45

## 2021-02-16 RX ADMIN — IPRATROPIUM BROMIDE AND ALBUTEROL SULFATE SCH ML: .5; 3 SOLUTION RESPIRATORY (INHALATION) at 17:30

## 2021-02-16 RX ADMIN — GUAIFENESIN SCH MG: 600 TABLET, EXTENDED RELEASE ORAL at 20:19

## 2021-02-16 RX ADMIN — IPRATROPIUM BROMIDE AND ALBUTEROL SULFATE SCH ML: .5; 3 SOLUTION RESPIRATORY (INHALATION) at 20:25

## 2021-02-16 RX ADMIN — IPRATROPIUM BROMIDE AND ALBUTEROL SULFATE SCH ML: .5; 3 SOLUTION RESPIRATORY (INHALATION) at 13:35

## 2021-02-16 RX ADMIN — ALUMINUM ZIRCONIUM TRICHLOROHYDREX GLY SCH MG: 0.2 STICK TOPICAL at 09:19

## 2021-02-16 NOTE — CT
EXAM DESCRIPTION: 

Chest w/o Contrast : Computed Tomography.



CLINICAL HISTORY:

75 years Male COVID



COMPARISON: 

Multiple chest radiographs since February 7.



TECHNIQUE: 

Spiral-axial scans at 5 mm intervals through the lungs and thorax

without IV contrast. 2. mm lung algorithm axial reconstructions. 

Coronal and sagittal 2.0 Mm reconstructions. No adverse

reactions. Total Exam DLP: 931 mGy-cm. This exam was performed

according to our departmental dose-optimization program which

includes automated exposure control, adjustment of the mA and/or

kV according to patient size and/or use of iterative

reconstruction technique; to reduce radiation dose to as low as

reasonably achievable (ALARA).  Nodule measurements under 10 mm

are given as mean value of 3 axes diameters.



FINDINGS: 

Lungs and large airways: Multiple subpleural infiltrates in the

bilateral upper lobes with consolidation occupying most of the

upper lobes, right middle lobe and lingula. Air bronchograms

within the consolidations which are more central. Bilateral lower

lobe groundglass nodules subpleural but small bilateral

consolidations with air bronchograms. This pattern is consistent

with COVID pneumonia and secondary bacterial pneumonia



Pleural spaces: No acute pleural process. Bilateral confluent

regions of thickening.



Mediastinum and Jacqui: Evaluation limited due to lack of IV

contrast small lymph nodes. No dominant soft tissue masses.

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

and fatty tissues with edema in the fat and fluid collection

subcarinal. This herniated measures approximately 8.7 x 5.3 cm in

the coronal plane with longest axis craniocaudal. 7 cm

anteroposterior. Calcifications versus surgical clips in the left

side of the hernia sac. Hernia originates from the right side of

the aorta through the right diaphragmatic jesús and the aortic

hiatus..



Great vessels and Heart: Evaluation limited due to lack of IV

contrast. Coronary artery calcifications. Calcifications

atherosclerotic arch and descending aorta. Common origin of the

right innominate artery in left common carotid artery, normal

variant. The posterior course of the proximal right innominate

artery is resulting in mass effect on the anterior left aspect of

the trachea.



Soft tissues of neck base, axillae, and chest wall: Evaluation

limited due to lack of IV contrast. Normal-sized axillary and

subclavian nodes.



Upper abdomen: Mesenteric fat in the hernia as previously

described. Ascites right upper quadrant. Dilated gallbladder with

the gallbladder fossa rotated anteriorly and to the right. Common

bile duct dilated. Mesenteric stranding in the epigastric region.

Included colon with mild to moderate constipation. No focal

lesions in the spleen and adrenal glands or liver but this is a

noncontrast study.



Osseous structures: Minimal spondylosis in the thoracic spine.

Partial compression of the T12 vertebral body anterior/centrally

and left more than right of unknown age. 3 mm retropulsion of the

superior endplate into the canal. Arthrosis shoulders and

clavicles and sternum.



IMPRESSION: 

1. Technically limited study due to patient motion and breathing.

Imaging features of the chest on CT scan can be seen with

COVID-19 pneumonia, though are nonspecific and can occur with a

variety of infectious and noninfectious processes.   Reference:

https://pubs.rsna.org/doi/full/10.1148/ryct.8552216780. 

Bilateral consolidations and air bronchograms are suggestive of

bilateral bacterial pneumonia involving upper and lower lobes and

probable secondary bacterial pneumonia. Bilateral volume loss and

atelectasis also.

2. Posterior inferior mediastinal hernia containing mesenteric

fat and blood vessels and is paraesophageal, but not involving

the stomach. Sestak complicating inflammatory fatty changes and

fluid within the hernia. Originating through the right

diaphragmatic jesús and aortic hiatus. Age unknown.

3. Ascites in the right upper quadrant and abutting the

gallbladder with possible inflammatory changes as well as mild

dilation of the common bile duct. Pancreas is unremarkable but

evaluation limited due to lack of IV contrast.

4. Partial compression of the anterior and central T12 vertebral

body left more than right. Canal narrowed by 3 mm retropulsion of

the superior endplate. Age of fracture indeterminate.

5. Normal variant of common origin of the right innominate artery

and the left common carotid artery. Vessels course posteriorly

from the origin with the right innominate artery exhibiting mass

effect on the trachea with deviation to the right of midline.



Electronically signed by:  Arcadio Wong MD  2/16/2021 12:28 PM

CST Workstation: [id number]

## 2021-02-16 NOTE — RAD
EXAM:  XR Chest, 1 View



CLINICAL HISTORY:  covid



TECHNIQUE:  Frontal view of the chest.



COMPARISON:  2/14/2021



FINDINGS:

  Lungs:  Mild increased bilateral multifocal airspace

consolidation and interstitial thickening.  Generalized

diminished lung volumes unchanged.

  Pleural space:  No pneumothorax.  No pleural effusion.

  Heart:  Stable cardiac shadow.

  Mediastinum:  Probable hiatal hernia.

  Bones/joints:  No osseous destruction or sclerosis noted.



IMPRESSION:     

  Mild increased pneumonia.



Electronically signed by:  Romy Sanabria MD  2/16/2021 7:34 AM

CST Workstation: [id number]

## 2021-02-16 NOTE — PN
SUPERVISING PHYSICIAN:  Geoff Jiménez MD



DATE: 02/16/21



SUBJECTIVE:  The patient is sitting up in bed.  He says he feels tired and short
of breath.  I explained to him that he may have a gallbladder issue and that we 
were doing further testing.  I also explained to him that I had talked to his 
son in regards to his clinical situation and he voiced understanding.



OBJECTIVE:  

VITAL SIGNS:  Temperature 97.3, heart rate 65, blood pressure 125/73, 
respiratory rate 16, O2 saturation 95% on 10 liters high flow nasal cannula.  

RESPIRATORY:  Diminished throughout.

CARDIAC: Regular rate and rhythm.  

GASTROINTESTINAL:  Abdomen is soft.  It is very mildly tender in the epigastric 
and left and right upper quadrants.  Bowel sounds are positive.

NEUROLOGIC: Awake, alert and oriented times three.  



LABORATORY:  WBCs 13,400, hemoglobin 16.6, hematocrit 49.9  He has a left shift 
on differential.  D-dimer 332.  Electrolytes are basically within normal limits 
with the exception of his magnesium slightly high at 3.  Total bilirubin 4.3.  
Liver enzymes are within normal limits.  C-reactive protein 8.6. 



RADIOLOGY:  Chest shows mild increased pneumonia.  CT of the chest shows 1) 
Technically limited study due to patient motion and breathing.  Imaging features
of the chest on CT scan can be seen with COVID-19 pneumonia although they are 
nonspecific and can occur with a variety of infectious and noninfectious 
processes.  Bilateral consolidations and airspace bronchograms are suggestive of
a bilateral bacterial pneumonia involving upper and lower lobe and probably 
secondary bacterial pneumonia.  Bilateral volume loss and atelectasis also.  2) 
Posterior inferior mediastinal hernia containing mesenteric fat and blood 
vessels and is paraesophageal, but not involving the stomach.  Sestak 
complicating inflammatory fatty changes and fluid within the hernia. Originating
through the right diaphragmatic jesús and aortic hiatus. Age unknown.  3) Ascites
in the right upper quadrant and abutting the gallbladder with possible 
inflammatory changes as well as mild dilation of the common bile duct. Pancreas 
is unremarkable but evaluation limited due to lack of IV contrast.  4) Partial 
compression of the anterior and central T12 vertebral body left more than right.
Canal narrowed by 3 mm retropulsion of the superior endplate. Age of fracture 
indeterminate.  5) Normal variant of common origin of the right innominate 
artery and the left common carotid artery. Vessels course posteriorly from the 
origin with the right innominate artery exhibiting mass effect on the trachea 
with deviation to the right of midline.



ASSESSMENT: 

1.  Acute hypoxemic respiratory failure.

2.  COVID-19 pneumonitis.

3.  Bilateral bacterial pneumonia, presently on Levaquin.

4.  Cholecystitis with dilation of common bile duct.

5.  Liver ascites in a patient that is a heavy drinker.

6.  Hyperbilirubinemia, most likely secondary to cholecystitis.

7.  Hypertension.



PLAN:  The patient may have cholecystitis with enlarged bile duct, so I have 
ordered a CT scan of the abdomen and pelvis with IV contrast.  We will need a 
sonogram as soon as sonography is available.  I discussed his case with Dr. Wong, radiologist, as well as Dr. Jiménez.  I will consult Dr. Chase.  I 
will continue with aggressive pulmonary hygiene and titrate his oxygen as 
tolerated.  I repeated his lab for in the morning.  After further investigation,
it was found that the patient does drink quite heavily at home and has for many 
years.  I am still not quite sure how much he actually does drink, but it is 
significantly more than he initially admitted.  At this point, I have also 
ordered a hepatitis panel as well as anemia panel.  



#[id number]

[facility]

## 2021-02-16 NOTE — CT
CT ABDOMEN PELVIS WITHOUT THEN WITH IV CONTRAST 



CLINICAL STATEMENT: elevated bilirubin



COMPARISON:  CT chest dated 2/16/2021.



This exam was performed according to our departmental

dose-optimization program which includes automated exposure

control, adjustment of the mA and/or kVp according to patient

size and/or use of iterative reconstruction technique where

applicable.



FINDINGS:



Moderate upper abdominal ascites. Liver, spleen, pancreas,

gallbladder, adrenal glands and kidneys are within normal limits.

No hydronephrosis or biliary dilatation.



No dilated loops of bowel to suggest obstruction. Moderate amount

stool in the colon. Mild diffuse colonic diverticulosis without

focal CT evidence for acute diverticulitis. No abdominal or

pelvic lymphadenopathy. Abdominal aorta mildly calcified without

aneurysm. Bladder is unremarkable.



IMPRESSION:



Moderate upper abdominal ascites. This is nonspecific. No

significant biliary dilatation. Moderate diffuse colon

diverticulosis without focal CT findings of acute diverticulitis.



Electronically signed by:  Phani Meyer MD  2/16/2021 5:51 PM CST

Workstation: [id number]

## 2021-02-17 RX ADMIN — IPRATROPIUM BROMIDE AND ALBUTEROL SULFATE SCH: .5; 3 SOLUTION RESPIRATORY (INHALATION) at 22:50

## 2021-02-17 RX ADMIN — IPRATROPIUM BROMIDE AND ALBUTEROL SULFATE SCH ML: .5; 3 SOLUTION RESPIRATORY (INHALATION) at 13:20

## 2021-02-17 RX ADMIN — GUAIFENESIN SCH MG: 600 TABLET, EXTENDED RELEASE ORAL at 20:57

## 2021-02-17 RX ADMIN — GUAIFENESIN SCH MG: 600 TABLET, EXTENDED RELEASE ORAL at 10:46

## 2021-02-17 RX ADMIN — ALUMINUM ZIRCONIUM TRICHLOROHYDREX GLY SCH MG: 0.2 STICK TOPICAL at 10:46

## 2021-02-17 RX ADMIN — ENOXAPARIN SODIUM SCH MG: 40 INJECTION, SOLUTION INTRAVENOUS; SUBCUTANEOUS at 20:57

## 2021-02-17 RX ADMIN — PANTOPRAZOLE SODIUM SCH MG: 40 TABLET, DELAYED RELEASE ORAL at 06:18

## 2021-02-17 RX ADMIN — IPRATROPIUM BROMIDE AND ALBUTEROL SULFATE SCH ML: .5; 3 SOLUTION RESPIRATORY (INHALATION) at 09:30

## 2021-02-17 RX ADMIN — LEVOFLOXACIN SCH MLS/HR: 5 INJECTION, SOLUTION INTRAVENOUS at 10:46

## 2021-02-17 RX ADMIN — IPRATROPIUM BROMIDE AND ALBUTEROL SULFATE SCH ML: .5; 3 SOLUTION RESPIRATORY (INHALATION) at 17:53

## 2021-02-18 RX ADMIN — IPRATROPIUM BROMIDE AND ALBUTEROL SULFATE SCH ML: .5; 3 SOLUTION RESPIRATORY (INHALATION) at 12:55

## 2021-02-18 RX ADMIN — ENOXAPARIN SODIUM SCH MG: 40 INJECTION, SOLUTION INTRAVENOUS; SUBCUTANEOUS at 20:32

## 2021-02-18 RX ADMIN — IPRATROPIUM BROMIDE AND ALBUTEROL SULFATE SCH ML: .5; 3 SOLUTION RESPIRATORY (INHALATION) at 21:38

## 2021-02-18 RX ADMIN — IPRATROPIUM BROMIDE AND ALBUTEROL SULFATE SCH ML: .5; 3 SOLUTION RESPIRATORY (INHALATION) at 16:46

## 2021-02-18 RX ADMIN — GUAIFENESIN SCH MG: 600 TABLET, EXTENDED RELEASE ORAL at 20:32

## 2021-02-18 RX ADMIN — PANTOPRAZOLE SODIUM SCH MG: 40 TABLET, DELAYED RELEASE ORAL at 05:58

## 2021-02-18 RX ADMIN — PROMETHAZINE HYDROCHLORIDE AND CODEINE PHOSPHATE PRN ML: 6.25; 1 SOLUTION ORAL at 19:17

## 2021-02-18 RX ADMIN — IPRATROPIUM BROMIDE AND ALBUTEROL SULFATE SCH ML: .5; 3 SOLUTION RESPIRATORY (INHALATION) at 09:31

## 2021-02-18 RX ADMIN — ALUMINUM ZIRCONIUM TRICHLOROHYDREX GLY SCH MG: 0.2 STICK TOPICAL at 09:09

## 2021-02-18 RX ADMIN — LEVOFLOXACIN SCH MLS/HR: 5 INJECTION, SOLUTION INTRAVENOUS at 09:10

## 2021-02-18 RX ADMIN — GUAIFENESIN SCH MG: 600 TABLET, EXTENDED RELEASE ORAL at 09:08

## 2021-02-18 RX ADMIN — PROMETHAZINE HYDROCHLORIDE AND CODEINE PHOSPHATE PRN ML: 6.25; 1 SOLUTION ORAL at 23:34

## 2021-02-18 NOTE — PN
SUPERVISING PHYSICIAN:  Geoff Jiménez MD



DATE:  02/18/21



SUBJECTIVE:  The patient now is on 7 liters nasal cannula and maintaining 93%.  
He is not in any distress.  He is not complaining of abdominal pain.  He has had
no nausea or vomiting.  Otherwise, he seems to be doing fairly well.  He has 
been off BiPAP now for 24 hours.



OBJECTIVE:  

VITAL SIGNS:  Temperature 97.2, pulse 69, blood pressure 140/85, respirations 
18, saturation 94% on 7 liters nasal cannula, high flow.

GENERAL:  The patient is resting comfortably with nasal cannula in place.  He is
not in any distress.  

CHEST:  Lung sounds show a notable rhonchi heard in the left upper lobe with 
some mild inspiratory and expiratory wheezing.  Otherwise, both lung bases are 
diminished.

HEART:  Regular rate and rhythm.  

ABDOMEN: Obese, but soft and nontender.  Positive bowel sounds.  

NEUROLOGIC: Alert and oriented times three.  



LABORATORY:  White count is down to 11,400, hemoglobin 15.7, hematocrit 46.3, 
platelet count 138,000.  Differential does show a left shift.  Coagulation 
studies show D-dimer 768.  Chemistries show sodium 134, BUN 17, creatinine 0.63.
 Bilirubin now down to 2.8.  Liver functions remain within normal limits.  C-
reactive protein 3.1.  



MICROBIOLOGY:  Blood cultures show no growth at 5 days.  



RADIOLOGY:  No repeat radiographic studies today.  



ASSESSMENT: 

1.  Acute hypoxemic respiratory failure.

2.  COVID-19 pneumonitis.

3.  Bilateral bacterial pneumonia, presently on Levaquin.

4.  Cholecystitis with dilatation of common bile duct.

5.  Liver ascites in a patient that is a heavy drinker.

6.  Hyperbilirubinemia, most likely secondary to cholecystitis, slightly 
improved.

7.  Hypertension.



PLAN:  We will continue current treatment with antibiotics with Levaquin.  I 
will continue with his Levaquin for at least 7 to 10 days.  Given his history of
alcoholism, he is certainly at high risk for community acquired pneumonia.  He 
remains on Protonix.  He is now on prednisone 40 mg daily and seems to be doing 
okay with that.  We will continue Lovenox and monitor D-dimer.  We will continue
with aggressive pulmonary hygiene with albuterol treatments.  He seems to be 
doing better with the nebulizer treatments.  We will continue to titrate his 
oxygen down as his oxygen saturations allow.  Dr. Chase is still following the
patient in regards to elevated bilirubin for further evaluation and needs 
regarding his abdominal issues with Dr. Chase.  He does have a pending 
abdominal scan which was not able to be done today due to the facility had no 
sono techs available.  Until the patient can transition to outpatient 
management, we will continue to monitor and treat as needed.  



#[id number]

[facility]

## 2021-02-18 NOTE — CONS
DATE OF CONSULTATION:  02/17/21



REASON FOR CONSULTATION:  Elevated bilirubin, possible cholecystitis.



HISTORY OF PRESENT ILLNESS:  This is a 75-year-old man admitted on 02/07/21 with
COVID pneumonitis, hypoxia and sepsis secondary to the above and history of 
hypertension.  He also has a history of inguinal hernia repair, knee replacement
and history of alcohol use, unclear how much.  I am called because of an 
elevated bilirubin that came rather suddenly, also white count of 14.  A CT scan
of his chest showed a distended gallbladder and hyperbilirubinemia with 
suspicion for cholecystitis.  



PAST MEDICAL HISTORY:  As above.  He was treated for COVID.  He is still here 
recovering and is still having some shortness of breath.  He does not complain 
any abdominal pain, but there was mild tenderness in the epigastrium.



PAST SURGICAL HISTORY:  As above.



SOCIAL HISTORY:  As above.



REVIEW OF SYSTEMS:  

HEENT:  No headache, visual changes, sore throat.  

RESPIRATORY:  No cough or wheeze.  

CARDIOVASCULAR:  No chest pain or palpitations.  He does have some shortness of 
breath.  He has not been particularly active.

GASTROINTESTINAL:  No nausea, vomiting, diarrhea.



PHYSICAL EXAMINATION: 



VITAL SIGNS:  Afebrile.  Temperature 97.4, heart rate 70, blood pressure 
140/60s, O2 saturation 92% on high flow O2.  He has been on oral intake with 
adequate urine output.



Other than his respiratory issues, the patient's abdominal exam is benign.  He 
is non-icteric.



LABORATORY:  White blood cell count 14, hematocrit 49, platelet count 175,000.  
No bandemia.  D-dimer 746 from 332 the day prior, done daily.  BMP is normal.  
Bilirubin had gone to 4.3.  On 02/17/12, it is 2.5.  Transaminases are normal.  
Albumin 2.5.  Hepatitis serology was normal.  INR 1.4.  We do not have a lipase.



RADIOLOGY:  CT abdomen and pelvis which was done after we got alerted from the 
CT of the chest shows multiple pleural infiltrates and air bronchograms, ascites
in the right upper quadrant and possible inflammatory changes of the 
gallbladder, mild dilation of the common duct, normal pancreas.  CT abdomen and 
pelvis I reviewed.  Official reading is moderate abdominal ascites.  My 
impression, the liver does look small, normal contour, but possibly history of 
cirrhosis on the liver.  We will get more into his history on that.  The 
gallbladder and everything else appears within normal limits.  There is no 
evidence of obstruction, so besides mild ascites, biliary ductal dilation, the 
system looks normal.  The CT scan is essentially normal.



IMPRESSION/PLAN:  Elevated bilirubin, some concern for possible cholecystitis.  
Recent CT scan looks normal.  We do not have ultrasound capability at this time,
but as soon as we do, we will get an ultrasound to rule out gallstones.  If he 
does have underlying liver disease, this could account for the appearance seen 
and concern for thickened gallbladder and the ascites.  There is no evidence of 
acute abdomen at this time, no evidence of infection or sepsis, so we will 
continue observation.



#[id number]

[facility]

## 2021-02-18 NOTE — PN
SUPERVISING PHYSICIAN:  Geoff Jiménez MD



DATE: 02/17/21



SUBJECTIVE:  The patient is sitting up in bed.  He denies chest pain, nausea, 
vomiting.  He says his shortness of breath is only minimal with exertion.  He 
feels much better than he did yesterday.



OBJECTIVE:  

VITAL SIGNS:  Temperature 97.8, heart rate 98, blood pressure 141/87, 
respiratory rate 22, O2 saturation 92% on 8 liters Airvo.

RESPIRATORY:  Diminished at the bases.

CARDIAC: Regular rate and rhythm

NEUROLOGIC: Awake, alert and oriented times three.  



LABORATORY:  WBCs 14,500, hemoglobin 16.5, hematocrit 49.9  He has a left shift 
on differential.  D-dimer 746.  Electrolytes are basically within normal limits.
 His total bilirubin is down to 2.5.  C-reactive protein 5.8.  All other labs 
and films have been reviewed via the EMR.



ASSESSMENT: 

1.  Acute hypoxemic respiratory failure.

2.  COVID-19 pneumonitis.

3.  Bilateral bacterial pneumonia, presently on Levaquin.

4.  Cholecystitis with dilatation of common bile duct.

5.  Liver ascites in a patient that is a heavy drinker.

6.  Hyperbilirubinemia, most likely secondary to cholecystitis, slightly 
improved.

7.  Hypertension.



PLAN:  We will continue present supportive care including COVID-19 guidelines.  
We will titrate oxygen a tolerated.  We will continue with aggressive pulmonary 
hygiene.  Dr. Chase will follow as far as his abdominal issues go.  I will 
order a sonogram of the abdomen tomorrow morning, repeat his lab for tomorrow.  
We will continue to monitor closely and follow as needed.  



#[id number]

[facility]

## 2021-02-19 RX ADMIN — GUAIFENESIN SCH MG: 600 TABLET, EXTENDED RELEASE ORAL at 21:02

## 2021-02-19 RX ADMIN — IPRATROPIUM BROMIDE AND ALBUTEROL SULFATE SCH ML: .5; 3 SOLUTION RESPIRATORY (INHALATION) at 21:18

## 2021-02-19 RX ADMIN — GUAIFENESIN SCH MG: 600 TABLET, EXTENDED RELEASE ORAL at 09:15

## 2021-02-19 RX ADMIN — ALUMINUM ZIRCONIUM TRICHLOROHYDREX GLY SCH MG: 0.2 STICK TOPICAL at 09:15

## 2021-02-19 RX ADMIN — IPRATROPIUM BROMIDE AND ALBUTEROL SULFATE SCH ML: .5; 3 SOLUTION RESPIRATORY (INHALATION) at 09:35

## 2021-02-19 RX ADMIN — PROMETHAZINE HYDROCHLORIDE AND CODEINE PHOSPHATE PRN ML: 6.25; 1 SOLUTION ORAL at 09:40

## 2021-02-19 RX ADMIN — PROMETHAZINE HYDROCHLORIDE AND CODEINE PHOSPHATE PRN ML: 6.25; 1 SOLUTION ORAL at 17:28

## 2021-02-19 RX ADMIN — ENOXAPARIN SODIUM SCH MG: 40 INJECTION, SOLUTION INTRAVENOUS; SUBCUTANEOUS at 21:02

## 2021-02-19 RX ADMIN — PROMETHAZINE HYDROCHLORIDE AND CODEINE PHOSPHATE PRN ML: 6.25; 1 SOLUTION ORAL at 21:30

## 2021-02-19 RX ADMIN — LEVOFLOXACIN SCH MLS/HR: 5 INJECTION, SOLUTION INTRAVENOUS at 10:30

## 2021-02-19 RX ADMIN — IPRATROPIUM BROMIDE AND ALBUTEROL SULFATE SCH ML: .5; 3 SOLUTION RESPIRATORY (INHALATION) at 13:19

## 2021-02-19 RX ADMIN — PANTOPRAZOLE SODIUM SCH MG: 40 TABLET, DELAYED RELEASE ORAL at 05:51

## 2021-02-19 RX ADMIN — IPRATROPIUM BROMIDE AND ALBUTEROL SULFATE SCH ML: .5; 3 SOLUTION RESPIRATORY (INHALATION) at 16:40

## 2021-02-19 NOTE — PN
SUPERVISING PHYSICIAN:  Geoff Jiménez MD



DATE:  02/19/21



SUBJECTIVE:  The patient actually looks to be doing a little better today.  He 
was up to the side of the bed eating lunch.  He is still wearing fairly high 
flow oxygen at 6 liters.  He is tolerating that well.  He has had no further 
complaints, no abdominal pain, no nausea, vomiting or diarrhea.



OBJECTIVE:  

VITAL SIGNS:  Temperature 97.5, pulse 63, blood pressure 120/87, respirations 
20, saturation 93-95% on 6 liters high flow nasal cannula.

GENERAL:  The patient is resting comfortably with nasal cannula in place.  He is
not in any distress.  

CHEST:  Lung sounds show a notable rhonchi heard in the left upper lobe with 
some mild inspiratory and expiratory wheezing.  Otherwise, both lung bases are 
diminished.

HEART:  Regular rate and rhythm.  

ABDOMEN: Obese, but soft and nontender.  Positive bowel sounds.  

NEUROLOGIC: Alert and oriented times three.  



LABORATORY:  White count continues to be slightly elevated at 12,900, with 
stable hemoglobin 15.9, hematocrit 47.6.  Differential shows a continued left 
shift.  Coagulation studies were not completed today.  Chemistries show  normal 
electrolytes.  Creatinine remains normal at 0.54.  Glucose 102, calcium 8.5.  
Magnesium has been stable at 2.5.  Bilirubin today is down to 2.2.  Liver 
enzymes remain normal.



MICROBIOLOGY:  Blood cultures are negative after 5 days.



RADIOLOGY:  Repeat chest x-ray this morning per radiologic interpretation shows 
lung volumes decreased with bilateral peripheral lung predominantly airspace 
opacities, stable to slightly increased compared to previous exam, consistent 
with viral pneumonia.  No significant pleural effusion or pneumothorax.  He also
had abdominal ultrasound which showed suboptimal evaluation, but gallbladder was
filled with sludge.  Common bile duct was not visualized.  There was no 
intrahepatic duct dilation.  



ASSESSMENT: 

1.  Acute hypoxemic respiratory failure.

2.  COVID-19 pneumonitis.

3.  Bilateral bacterial pneumonia, presently on Levaquin.

4.  Cholecystitis with dilatation of common bile duct.

5.  Liver ascites in a patient that is a heavy drinker.

6.  Hyperbilirubinemia, most likely secondary to cholecystitis, slightly 
improved.

7.  Hypertension.



PLAN:  We will continue with another day of Levaquin.  He has finished all other
treatments.  We just need to make sure he has finished a full course of Levaquin
between 7 and 10 days, at which time I think we can discontinue that.  
Hopefully, he will be able to continue to titrate his oxygen down at least to 
nasal cannula at 4 liters at which point we could discharge him.  He does remain
on prednisone.  We may need to start titrating that off at some point if he does
not go home in the next day or two.  We will Lovenox for his D-dimer as needed. 
He does remain on aggressive pulmonary hygiene.  I have actually added chest 
percussive therapy to see if this will help.  Dr. Chase is still following the
patient.  He has had no abdominal pains.  His bilirubin is normalizing.  I 
anticipate him hopefully that we can probably discharge him in the next 48 to 72
hours depending on how he titrates his oxygen needs.  Until the patient can 
transition to outpatient management, we will continue to monitor and treat as 
needed.  



#[id number]

[facility]

## 2021-02-19 NOTE — RAD
EXAM DESCRIPTION: Chest,1 View



CLINICAL HISTORY: 75 years Male, COVID PNA



COMPARISON: 2/16/2021.



TECHNIQUE: AP portable chest.



FINDINGS/IMPRESSION:



The lung volumes are decreased. Bilateral peripheral lung

predominant airspace opacities are stable to slightly increased

when compared to the previous examination and consistent with

viral pneumonia. No significant pleural effusion or pneumothorax.



The heart is normal in size. No acute osseous abnormality.



Electronically signed by:  Marvin Escamilla DO  2/19/2021 8:11 AM CST

Workstation: [id number]

## 2021-02-19 NOTE — US
EXAM DESCRIPTION: 



Abdomen sonogram, complete



CLINICAL HISTORY: 



Abdominal pain. Elevated bilirubin 



COMPARISON: 



[None.]



FINDINGS: 



Gallbladder is partially filled with sludge. . Gallbladder wall

thickness 4 mm

Common bile duct not visualized. No intrahepatic duct dilation.

Liver evaluation is limited. No diagnostic abnormality of the

visualized portions. Spleen normal in size and echotexture

Pancreas is is not seen secondary to overlying bowel gas and body

habitus. 

Visualized abdominal aorta and inferior vena cava are normal

Right kidney and left kidney are normal



IMPRESSION: 



Suboptimal evaluation. Gallbladder filled with sludge. Common

bile duct not visualized. No intrahepatic duct dilation



Electronically signed by:  Geoff Amaral MD  2/19/2021 10:59

AM CST Workstation: [id number]

## 2021-02-20 RX ADMIN — LEVOFLOXACIN SCH MLS/HR: 5 INJECTION, SOLUTION INTRAVENOUS at 09:29

## 2021-02-20 RX ADMIN — HALOPERIDOL LACTATE PRN MG: 5 INJECTION, SOLUTION INTRAMUSCULAR at 21:43

## 2021-02-20 RX ADMIN — PANTOPRAZOLE SODIUM SCH MG: 40 TABLET, DELAYED RELEASE ORAL at 06:00

## 2021-02-20 RX ADMIN — IPRATROPIUM BROMIDE AND ALBUTEROL SULFATE SCH ML: .5; 3 SOLUTION RESPIRATORY (INHALATION) at 21:16

## 2021-02-20 RX ADMIN — IPRATROPIUM BROMIDE AND ALBUTEROL SULFATE SCH ML: .5; 3 SOLUTION RESPIRATORY (INHALATION) at 09:00

## 2021-02-20 RX ADMIN — GUAIFENESIN SCH MG: 600 TABLET, EXTENDED RELEASE ORAL at 21:42

## 2021-02-20 RX ADMIN — PROMETHAZINE HYDROCHLORIDE AND CODEINE PHOSPHATE PRN ML: 6.25; 1 SOLUTION ORAL at 01:28

## 2021-02-20 RX ADMIN — GUAIFENESIN SCH MG: 600 TABLET, EXTENDED RELEASE ORAL at 09:29

## 2021-02-20 RX ADMIN — IPRATROPIUM BROMIDE AND ALBUTEROL SULFATE SCH ML: .5; 3 SOLUTION RESPIRATORY (INHALATION) at 17:30

## 2021-02-20 RX ADMIN — IPRATROPIUM BROMIDE AND ALBUTEROL SULFATE SCH ML: .5; 3 SOLUTION RESPIRATORY (INHALATION) at 13:00

## 2021-02-20 RX ADMIN — ENOXAPARIN SODIUM SCH MG: 40 INJECTION, SOLUTION INTRAVENOUS; SUBCUTANEOUS at 21:31

## 2021-02-20 RX ADMIN — ALUMINUM ZIRCONIUM TRICHLOROHYDREX GLY SCH MG: 0.2 STICK TOPICAL at 09:29

## 2021-02-20 RX ADMIN — HALOPERIDOL LACTATE PRN MG: 5 INJECTION, SOLUTION INTRAMUSCULAR at 15:13

## 2021-02-20 NOTE — PN
DATE:  02/20/21



Followup for possible cholecystitis.



SUBJECTIVE:   The patient has no complaints today, no abdominal pain, no nausea 
or vomiting.  No postprandial pain, no diarrhea.



Ultrasound was performed which showed some sludge, 4 mm duct, limited liver 
evaluation and common duct not visualized, suboptimal exam.



OBJECTIVE:

VITAL SIGNS:  He has been afebrile.  T-max 99.  Pulse in 90s.  Oxygen saturation
92% on high flow oxygen.  Adequate urine output.



LABORATORY:  White count 12, hematocrit 47, total bilirubin 2.2, this has come 
down.  Other transaminases are normal.  Hepatitis panel normal.



IMPRESSION:  

Hyperbilirubinemia, mostly direct fraction.  He does have some sludge and a 
slight thickened wall gallbladder.  This would be some suggestion of possible 
liver disease from alcohol use in the past but at this time, the exam is benign 
and he is having no pain so I do not think he has acute cholecystitis.  We will 
continue watching him for any sign of complications.  Otherwise, continue 
medical management for his Covid and respiratory issues and he can have a 
regular low-fat diet.



#[id number]

[facility]

## 2021-02-20 NOTE — PN
SUPERVISING PHYSICIAN:  Geoff Jiménez MD



DATE: 02/20/21



SUBJECTIVE:  The patient is resting up in bed at this time.  It was reported 
earlier that the patient became very confused and had increasing oxygen 
requirements.  He had some visual hallucinations.  His son did come to visit him
and he was able to reorient him.



OBJECTIVE:  

VITAL SIGNS:  Temperature 99, heart rate 98, blood pressure 130/80, respiratory 
rate 24, O2 saturation 92% on 15 liters Airvo.

RESPIRATORY:  Diminished throughout. 

CARDIAC: Regular rate and rhythm.

ABDOMEN:  Soft, nondistended, non-tender.  Bowel sounds are positive.

NEUROLOGIC: Slightly sedated but opens his eyes to command.  



LABORATORY:  PC02 is 38, PO2 is 60, pH 7.47, oxygen saturation of 92.3%.  



ASSESSMENT: 

1.  Acute hypoxemic respiratory failure.

2.  COVID-19 pneumonitis.

3.  Bilateral bacterial pneumonia, presently on Levaquin.

4.  Hospital psychosis. 

5.  Cholecystitis with dilatation of common bile duct.

6.  Liver ascites in a patient that is a heavy drinker.

7.  Hyperbilirubinemia,

8.  Hypertension.



PLAN:  We will continue present supportive care.  I have ordered some Haldol for
the hospital psychosis.  Hopefully, that will keep him comfortable.  We will 
again titrate his oxygen as tolerated.  I have ordered lab and chest x-ray for 
in the morning.  



#[id number]

[facility]

## 2021-02-21 RX ADMIN — ENOXAPARIN SODIUM SCH MG: 40 INJECTION, SOLUTION INTRAVENOUS; SUBCUTANEOUS at 20:32

## 2021-02-21 RX ADMIN — ALUMINUM ZIRCONIUM TRICHLOROHYDREX GLY SCH MG: 0.2 STICK TOPICAL at 07:25

## 2021-02-21 RX ADMIN — LEVOFLOXACIN SCH MLS/HR: 5 INJECTION, SOLUTION INTRAVENOUS at 10:08

## 2021-02-21 RX ADMIN — IPRATROPIUM BROMIDE AND ALBUTEROL SULFATE SCH ML: .5; 3 SOLUTION RESPIRATORY (INHALATION) at 13:30

## 2021-02-21 RX ADMIN — IPRATROPIUM BROMIDE AND ALBUTEROL SULFATE SCH ML: .5; 3 SOLUTION RESPIRATORY (INHALATION) at 17:30

## 2021-02-21 RX ADMIN — PANTOPRAZOLE SODIUM SCH MG: 40 TABLET, DELAYED RELEASE ORAL at 06:13

## 2021-02-21 RX ADMIN — IPRATROPIUM BROMIDE AND ALBUTEROL SULFATE SCH ML: .5; 3 SOLUTION RESPIRATORY (INHALATION) at 21:09

## 2021-02-21 RX ADMIN — IPRATROPIUM BROMIDE AND ALBUTEROL SULFATE SCH ML: .5; 3 SOLUTION RESPIRATORY (INHALATION) at 09:00

## 2021-02-21 RX ADMIN — GUAIFENESIN SCH MG: 600 TABLET, EXTENDED RELEASE ORAL at 07:25

## 2021-02-21 RX ADMIN — GUAIFENESIN SCH MG: 600 TABLET, EXTENDED RELEASE ORAL at 20:32

## 2021-02-21 NOTE — PN
SUPERVISING PHYSICIAN:  Geoff Jiménez MD



DATE: 02/21/21



SUBJECTIVE:  The patient is lying in bed asleep.  His son is at the bedside.  He
is less confused today than yesterday but it does help when the son is in the 
room.  The patient denies any worsening shortness of breath or chest pain.  His 
son and I did discuss that the patient's bilirubin had worsened and that due to 
multiple comorbidities, we may try to transfer the patient after I speak with 
Dr. Chase.



OBJECTIVE:  

VITAL SIGNS:  Temperature 98.6, heart rate 93, blood pressure 141/87, 
respiratory rate 20, O2 saturation 96% on BiPAP. 

RESPIRATORY:  Diminished throughout, although there are some bowel sounds heard 
up in the lower chest area, especially periumbilical.

CARDIAC: Regular rate and rhythm.

NEUROLOGIC: He is awake, answers some simple questions appropriately.



LABORATORY:  WBCs 15,300, hemoglobin 16.5, hematocrit 49.1.  He has a left shift
on his differential.  D-dimer is down to 546.  Electrolytes are basically within
normal limits.  Bilirubin is 5.7.  Direct and indirect are pending.  Liver 
enzymes are within normal limits. C-reactive protein is 5.6.  Hepatitis panel is
pending.



RADIOLOGY:  Chest x-ray shows bilateral pneumonia.



ASSESSMENT: 

1.  Acute hypoxemic respiratory failure.

2.  COVID-19 pneumonitis.

3.  Bilateral bacterial pneumonia, presently on Levaquin.

4.  Hyperbilirubinemia,

5.  Cholecystitis with dilatation of common bile duct.

6.  Liver ascites in a patient that is a heavy drinker.

7.  Hospital psychosis. 

8.  Hypertension.



PLAN:  We will continue present supportive care.  I will discuss transfer with 
Dr. Chase and if he recommends transfer for evaluation of his gallbladder, I 
will begin that process. For now, I have ordered lab for in the morning.  I will
hold on a chest x-ray. His lópez will need to be discontinued as soon as 
clinically appropriate.  



#[id number]



ADDENDUM: 16:45 Spoke with hospitalist and  in [place] at [facility], 
in regard to transfer. Dr. Guadarrama recommended and MRCP and lab test (GGT, 
Haptoglobin, fibrinogen) tomorrow. Depending on results, will re-evaluate 
transfer tomorrow. 

[facility]

## 2021-02-21 NOTE — RAD
: 1945.



Technique: Portable AP chest x-ray. Comparison: 2021.



Clinical history: covid.



Heart size: Normal.

Lungs: Shallow inspiration. Extensive bilateral airspace

infiltrates consistent with viral pneumonia. No appreciable

change although the left diaphragm contour is less well-defined

now and could be indicative of progression in the left lower

lobe.

Pleura: No pleural effusion. No pneumothorax.

Mediastinum and ye: Unremarkable.

Skeletal: Unremarkable.

Support tubings: None.



Impression:

1.  Bilateral pneumonia.



Electronically signed by:  Riley Hernandez MD  2021 9:31 AM

[facility] Workstation: [id number]

## 2021-02-22 RX ADMIN — DIPHENHYDRAMINE HYDROCHLORIDE PRN ML: 12.5 LIQUID ORAL at 22:47

## 2021-02-22 RX ADMIN — ENOXAPARIN SODIUM SCH MG: 40 INJECTION, SOLUTION INTRAVENOUS; SUBCUTANEOUS at 20:43

## 2021-02-22 RX ADMIN — GUAIFENESIN SCH MG: 600 TABLET, EXTENDED RELEASE ORAL at 20:43

## 2021-02-22 RX ADMIN — IPRATROPIUM BROMIDE AND ALBUTEROL SULFATE SCH ML: .5; 3 SOLUTION RESPIRATORY (INHALATION) at 15:40

## 2021-02-22 RX ADMIN — GUAIFENESIN SCH MG: 600 TABLET, EXTENDED RELEASE ORAL at 08:35

## 2021-02-22 RX ADMIN — IPRATROPIUM BROMIDE AND ALBUTEROL SULFATE SCH ML: .5; 3 SOLUTION RESPIRATORY (INHALATION) at 12:26

## 2021-02-22 RX ADMIN — PANTOPRAZOLE SODIUM SCH MG: 40 TABLET, DELAYED RELEASE ORAL at 05:47

## 2021-02-22 RX ADMIN — IPRATROPIUM BROMIDE AND ALBUTEROL SULFATE SCH ML: .5; 3 SOLUTION RESPIRATORY (INHALATION) at 07:58

## 2021-02-22 RX ADMIN — ALUMINUM ZIRCONIUM TRICHLOROHYDREX GLY SCH MG: 0.2 STICK TOPICAL at 08:35

## 2021-02-22 RX ADMIN — IPRATROPIUM BROMIDE AND ALBUTEROL SULFATE SCH ML: .5; 3 SOLUTION RESPIRATORY (INHALATION) at 20:30

## 2021-02-22 RX ADMIN — LEVOFLOXACIN SCH MLS/HR: 5 INJECTION, SOLUTION INTRAVENOUS at 08:36

## 2021-02-22 NOTE — PN
SUPERVISING PHYSICIAN:  Harry Liang M.D.



DATE:  2/22/21



SUBJECTIVE:  The patient is sitting up in bed.  His son is at the bedside.  It 
was reported by nursing that the patient actually tried to eat some yogurt and 
after several bites was required to go on BiPAP due to shortness of breath.  He 
does get very tachypneic with accessory muscle use with any exertion.  Denies 
nausea, vomiting, diarrhea or constipation.



OBJECTIVE:

VITAL SIGNS:  Temperature 99.6, heart rate 80, blood pressure 147/85, 
respiratory rate 26, O2 saturation 93% on 10 liters of Airvo.

RESPIRATORY:  Diminished throughout.

CARDIAC:  Regular rate and rhythm.

GASTROINTESTINAL:  Abdomen is soft, nondistended, non-tender.  Bowel sounds are 
positive.

NEUROLOGIC:  He is awake, alert and oriented times three.



LABORATORY:  WBCs are 15,200 with hemoglobin 16.1, hematocrit 47.7.  He has a 
left shift on his differential.  Haptoglobin is pending.  D-dimer is 574.  
Sodium 132, potassium 4.7, chloride 98, BUN 19, creatinine 0.62, glucose 106.  
Total bilirubin 4.2, GGT is 231, C reactive protein 5.7.  Hepatitis panel is 
pending.  All other labs and films have been reviewed via the EMR.



ASSESSMENT: 

1.   Acute hypoxemic respiratory failure.

2.   COVID-19 pneumonitis.

3.   Bilateral bacterial pneumonia, presently on Levaquin.

4.   Hyperbilirubinemia,

5.   Questionable cholecystitis with suspected dilatation of common bile duct 
with an

      elevated indirect bilirubin.

6.   Liver ascites in a patient that is a heavy drinker.

7.   Hospital psychosis. 

8.   Hypertension.



PLAN:  We will continue present supportive care.  I have ordered lab for in the 
morning as well as a chest x-ray.  We had multiple attempts at transferring the 
patient but there was either no available beds or they felt that a transfer 
would not change his plan of care.  I had a lengthy discussion with his family 
about his status and hopefully he will improve.  Will monitor and treat as 
needed.



#[id number]

[facility]

## 2021-02-22 NOTE — PN
DATE:  02/21/21



FOLLOWUP FOR ELEVATED BILIRUBIN



SUBJECTIVE:  The patient has no new complaints.  He is still on oxygen and 
having difficulty breathing.  He does not complain of abdominal pain.



OBJECTIVE:

VITAL SIGNS:  Temperature 97, heart rate 90s, blood pressure 120s/70s, 
respiratory rate 20, saturation 96% on 10 liters high flow.  

GENERAL:  She is alert and oriented, in no distress.

ABDOMEN:  Benign.



LABORATORY:  White count 15, hematocrit 49, platelet count 134.  No bandemia.  
D-dimer 546, total bilirubin 5.7, direct 1.5.  AST 34, ALT 49, alkaline 
phosphatase 89, which are all within normal limits.  GGT is elevated.  Alkaline 
phosphatase is normal.  



RADIOLOGY:  Chest x-ray shows continued bilateral pneumonia.  Again, his 
abdominal ultrasound showed some sludge, slightly thickened wall of his 
gallbladder, but no pericholecystic fluid.  CT of his abdomen is essentially 
normal.



ASSESSMENT: 

1.  COVID.

2.  Bilateral pneumonia.

3.  Elevated bilirubin with primary indirect fraction.  Other liver function 
tests are 

     normal.  His exam is benign, so I do not feel that this is related to his 
gallbladder 

     at this time or common bile duct obstruction.  Elevated GGT could be from 
multiple

     sources.  



Medicine consulted with GI apparently today and they are recommending an MRCP, 
so that will be ordered tomorrow.



#[id number]

[facility]

## 2021-02-23 RX ADMIN — GUAIFENESIN SCH MG: 600 TABLET, EXTENDED RELEASE ORAL at 08:39

## 2021-02-23 RX ADMIN — GUAIFENESIN SCH MG: 600 TABLET, EXTENDED RELEASE ORAL at 21:18

## 2021-02-23 RX ADMIN — DIPHENHYDRAMINE HYDROCHLORIDE PRN ML: 12.5 LIQUID ORAL at 21:19

## 2021-02-23 RX ADMIN — IPRATROPIUM BROMIDE AND ALBUTEROL SULFATE SCH ML: .5; 3 SOLUTION RESPIRATORY (INHALATION) at 19:45

## 2021-02-23 RX ADMIN — ALUMINUM ZIRCONIUM TRICHLOROHYDREX GLY SCH MG: 0.2 STICK TOPICAL at 08:39

## 2021-02-23 RX ADMIN — IPRATROPIUM BROMIDE AND ALBUTEROL SULFATE SCH ML: .5; 3 SOLUTION RESPIRATORY (INHALATION) at 16:30

## 2021-02-23 RX ADMIN — PANTOPRAZOLE SODIUM SCH MG: 40 TABLET, DELAYED RELEASE ORAL at 05:58

## 2021-02-23 RX ADMIN — IPRATROPIUM BROMIDE AND ALBUTEROL SULFATE SCH ML: .5; 3 SOLUTION RESPIRATORY (INHALATION) at 08:58

## 2021-02-23 RX ADMIN — DIPHENHYDRAMINE HYDROCHLORIDE PRN ML: 12.5 LIQUID ORAL at 04:53

## 2021-02-23 RX ADMIN — ENOXAPARIN SODIUM SCH MG: 40 INJECTION, SOLUTION INTRAVENOUS; SUBCUTANEOUS at 21:18

## 2021-02-23 RX ADMIN — IPRATROPIUM BROMIDE AND ALBUTEROL SULFATE SCH ML: .5; 3 SOLUTION RESPIRATORY (INHALATION) at 13:00

## 2021-02-23 NOTE — RAD
EXAM:



Single view chest.



INDICATION:  



Covid.



COMPARISON: 



Chest x-ray: 2/21/2021.



FINDINGS:



Low volume lungs again demonstrate diffuse interstitial and

airspace opacities. Stable elevation of the right hemidiaphragm.

Heart size is stable. No pneumothorax. Bones are unchanged.



IMPRESSION:



No significant change compared to the prior exam



Electronically signed by:  Mike Chery MD  2/23/2021 7:11 AM CST

Workstation: [id number]

## 2021-02-23 NOTE — PN
SUPERVISING PHYSICIAN:  Harry Liang MD



DATE:  02/23/21



SUBJECTIVE:  The patient is still requiring high flow oxygen via nasal cannula 
via the Airvo system.  He is not really complaining of anything other than just 
ready to go home.  He has been on and off BiPAP.  He does not like to be on 
BiPAP and to be given either some Haldol or Ativan.  



OBJECTIVE:  

VITAL SIGNS:  Temperature 97.8, pulse 109, blood pressure 148/84, respirations 
20, saturation 93% on high flow nasal cannula via Airvo system.  

RESPIRATORY:  Diminished throughout.

CARDIAC:  Regular rate and rhythm.

GASTROINTESTINAL:  Abdomen is soft, nondistended, nontender.  Bowel sounds are 
positive.

NEUROLOGIC:  He is awake, alert and oriented times three.



LABORATORY:  White count 13,000 with continued left shift.  Coagulation studies 
show D-dimer 725.  Chemistries show stable sodium at 132 with creatinine 0.55.  
Blood sugar 103.  Calcium 8.6.  Bilirubin is down to 3.7.  Liver functions other
than that are within normal limits.  He did have a hepatitis panel that was 
negative for hepatitis C antibodies, B.  Please see that report details.



RADIOLOGY:  Chest x-ray this morning per radiologic interpretation showed no 
significant change from previous exam, just low lung volume with diffuse 
interstitial airspace opacities.  There is noted stable elevation of the right 
hemidiaphragm.



ASSESSMENT: 

1.   Acute hypoxemic respiratory failure.

2.   COVID-19 pneumonitis.

3.   Bilateral bacterial pneumonia. Treated with two weeks of IV antibiotics

4.   Hyperbilirubinemia,

5.   Questionable cholecystitis with suspected dilatation of common bile duct 
with an

      elevated indirect bilirubin.

6.   Liver ascites in a patient that is a heavy drinker.

7.   Hospital psychosis. 

8.   Hypertension.



PLAN:  We will continue to titrate the patient off of high flow oxygen as 
possible.  He has finished his antibiotics.  He has been on antibiotics now for 
at least 14 days including Rocephin, azithromycin and Levaquin.  He has 
completed the regimen of remdesivir.  He does remain on steroids in the form of 
p.o. prednisone.  He is on aggressive pulmonary hygiene.  Dr. Chase continues 
to follow the patient in regards to the elevated bilirubin.  GI was visited with
and recommended MRCP, however, the patient is not able to tolerate being off 
oxygen at this time and it is not feasible to do any kind of further testing in 
regards to the bilirubin.  He has not really had any complaints of abdominal 
pain.  Bilirubin seems to be returning to baseline levels.  We will continue to 
monitor that as possible.  Again, discussion with family as they adamant that 
the patient be transferred, which this is in process, however, the patient seems
to be fairly well.  We will continue to titrate his oxygen down as tolerated.  
Until the patient can transfer the patient or transition to outpatient 
management, we will continue to monitor and treat as needed.  



#[id number]

[facility]

## 2021-02-24 RX ADMIN — PANTOPRAZOLE SODIUM SCH MG: 40 TABLET, DELAYED RELEASE ORAL at 06:02

## 2021-02-24 RX ADMIN — GUAIFENESIN SCH MG: 600 TABLET, EXTENDED RELEASE ORAL at 08:11

## 2021-02-24 RX ADMIN — IPRATROPIUM BROMIDE AND ALBUTEROL SULFATE SCH ML: .5; 3 SOLUTION RESPIRATORY (INHALATION) at 20:00

## 2021-02-24 RX ADMIN — IPRATROPIUM BROMIDE AND ALBUTEROL SULFATE SCH ML: .5; 3 SOLUTION RESPIRATORY (INHALATION) at 17:00

## 2021-02-24 RX ADMIN — ENOXAPARIN SODIUM SCH MG: 40 INJECTION, SOLUTION INTRAVENOUS; SUBCUTANEOUS at 20:34

## 2021-02-24 RX ADMIN — HALOPERIDOL LACTATE PRN MG: 5 INJECTION, SOLUTION INTRAMUSCULAR at 07:50

## 2021-02-24 RX ADMIN — DIPHENHYDRAMINE HYDROCHLORIDE PRN ML: 12.5 LIQUID ORAL at 04:15

## 2021-02-24 RX ADMIN — IPRATROPIUM BROMIDE AND ALBUTEROL SULFATE SCH ML: .5; 3 SOLUTION RESPIRATORY (INHALATION) at 09:00

## 2021-02-24 RX ADMIN — DIPHENHYDRAMINE HYDROCHLORIDE PRN ML: 12.5 LIQUID ORAL at 16:25

## 2021-02-24 RX ADMIN — IPRATROPIUM BROMIDE AND ALBUTEROL SULFATE SCH: .5; 3 SOLUTION RESPIRATORY (INHALATION) at 13:00

## 2021-02-24 RX ADMIN — BUDESONIDE SCH: 0.5 SUSPENSION RESPIRATORY (INHALATION) at 13:00

## 2021-02-24 RX ADMIN — GUAIFENESIN SCH MG: 600 TABLET, EXTENDED RELEASE ORAL at 20:34

## 2021-02-24 RX ADMIN — BUDESONIDE SCH MG: 0.5 SUSPENSION RESPIRATORY (INHALATION) at 20:00

## 2021-02-24 RX ADMIN — ALUMINUM ZIRCONIUM TRICHLOROHYDREX GLY SCH MG: 0.2 STICK TOPICAL at 08:11

## 2021-02-24 RX ADMIN — ZIPRASIDONE HYDROCHLORIDE SCH MG: 20 CAPSULE ORAL at 16:19

## 2021-02-24 NOTE — CONS
DATE OF CONSULTATION:  02/24/21



REASON FOR CONSULTATION:  Hyperbilirubinemia, pneumonitis, COVID, bilateral 
pneumonia.



HISTORY OF PRESENT ILLNESS: This is a 75-year-old male with a history of 
hypertension admitted with a previous positive COVID test on January 31, 2021.  
He decompensated from the pulmonary standpoint, now with bilateral pneumonia.  I
was consulted due to increasing bilirubin.  The patient was receiving Levaquin 
and bilirubin increased up to 5 grams per deciliter range.  Levaquin was 
discontinued on 02/22/21.  Bilirubin has gone down to the 3 range.  Liver 
enzymes remain relatively stable.  Imaging shows ultrasonographic evidence of 
biliary dilation.  There are two CT scans showing conflicting information, one 
showing some intrahepatic dilation and another showing no biliary dilation.  
Currently, on high flow oxygen.  The patient is agitated and confused.  



PAST MEDICAL HISTORY: 

1.  Hypertension.



PAST SURGICAL HISTORY:  

1.  Left inguinal hernia.

2.  Eye surgery.

3.  Left total knee replacement.



CURRENT MEDICATIONS:  Please see hospital medication reconciliation forms.



ALLERGIES:  NO KNOWN DRUG ALLERGIES.



FAMILY HISTORY: Positive for dementia.



SOCIAL HISTORY: Occasional alcohol use.  .  He lives alone at home.



REVIEW OF SYSTEMS: Positive for agitation, shortness of breath.  Negative for 
chest pain or palpitations.  Negative for abdominal pain, nausea or vomiting.  
Negative for dysuria or hematuria.  Positive for arthralgias.  Negative for 
icterus, rashes or jaundice.  Positive for confusion and disorientation.



PHYSICAL EXAMINATION: 



VITAL SIGNS:  On high flow oxygen, oxygen saturation 92%, temperature 98.5, 
pulse 74, blood pressure 145/82, respirations 25 per minute.  



GENERAL:  The patient is in acute respiratory distress.  



HEENT:  Pink oropharynx.



NECK:  Supple, nontender.



CHEST:  Bilateral crackles.



HEART:  Regular rhythm, tachycardic.



ABDOMEN:  Soft, nontender, nondistended.  No rebound.  No Jensen sign.



BACK:  No CVA tenderness.



EXTREMITIES:  Mild cyanosis.



NEUROLOGIC:  Awake, alert, but not oriented.  Cranial nerves II-XII are grossly 
intact.



SKIN:  Warm, pink.



LABORATORY:  On 02/22/21, bilirubin 4.2, AST 28, ALT 38, alkaline phosphatase 
85.  C-reactive protein 5.7.  On 02/23/21, total bilirubin 3.7, AST 27, ALT 34, 
alkaline phosphatase 80.  



IMAGING:  CT scan of the chest without contrast shows COVID pneumonia with 
bilateral consolidations.  A posterior inferior mediastinal hernia containing 
mesenteric fat and blood vessels without involvement of the stomach.  There is 
description of Sestak complicating inflammatory fatty change and fluid within 
the hernia originating through the right diaphragmatic jesús and aortic hiatus.  
This seems to be chronic process.  There is also mention of ascites in the right
upper quadrant and abutting the gallbladder with possible inflammatory changes 
as well as mild dilation of the common bile duct.  Pancreas is unremarkable with
evaluation limited due to lack of IV contrast.  A CT scan of the abdomen with 
and without contrast shows evidence of abdominal ascites which is nonspecific, 
no significant biliary dilation, moderate diffuse colon diverticulosis without 
focal CT scan finding of acute diverticulitis.  



ASSESSMENT:

1.  Bilateral COVID pneumonia.

2.  Elevated bilirubin, likely multifactorial.  Etiology may relate to increased
right

     cardiac pressures due to bilateral pneumonia in combination with antibiotic
use.

     Intrinsic liver disease is possible.  There is no major evidence of biliary
dilation. 

     There is conflicting information about mild common bile duct dilation on CT
scan

     without contrast, does not show in CT scan with contrast.  Bilirubin is 
trending down

     after stopping antibiotics.  I expect resolution or great improvement of 
bilirubin almost

     to normal.



RECOMMENDATION:  Recommend to continue to follow bilirubin and liver enzymes 
until normalization and further followup with GI after recovery.  If bilirubin 
remains elevated, consider MRCP.  Please call if bilirubin or liver enzymes 
raise despite general clinical improvement.  



#[id number]

[facility]

## 2021-02-24 NOTE — PN
SUPERVISING PHYSICIAN:  Harry Liang MD



DATE:  02/24/21



SUBJECTIVE:  The patient is still requiring fairly high amount of oxygen.  He is
having episodes of confusion and having to use Haldol and Geodon.  He will take 
all his clothes off and his oxygen and desaturates fairly quickly, but does not 
really get in distress.  



OBJECTIVE:  

VITAL SIGNS:  Temperature 97.9, blood pressure 133/81, respirations 24, 
saturation 94% on high flow nasal cannula via Airvo system with FiO2 of 70%.

GENERAL:  At the time of my exam, the patient is resting comfortably and is 
cooperative.  He was alert, but confused on location.

RESPIRATORY:  Diminished throughout.

CARDIAC:  Regular rate and rhythm.

GASTROINTESTINAL:  Abdomen is soft, nontender.  Bowel sounds are positive.

EXTREMITIES:  No cyanosis, clubbing or edema.

NEUROLOGIC:  He is alert, but not oriented to location with no obvious 
neurologic deficits noted on exam.

SKIN:  Warm, pink and dry.



LABORATORY:  Only repeat lab today was total bilirubin which is up from 
yesterday at 4.5.  Direct bilirubin 1.3, indirect 3.2.  Otherwise, on other lab 
studies were done today.



RADIOLOGY:  No repeat radiographic studies today.



ASSESSMENT: 

1.   Acute hypoxemic respiratory failure.

2.   COVID-19 pneumonitis.

3.   Bilateral bacterial pneumonia. Treated with two weeks of IV antibiotics

4.   Hyperbilirubinemia,

5.   Questionable cholecystitis with suspected dilatation of common bile duct 
with an

      elevated indirect bilirubin.

6.   Liver ascites in a patient that is a heavy drinker.

7.   Hospital psychosis. 

8.   Hypertension.



PLAN:  We will continue current plan of care and following his bilirubin.  He is
going to be seen by GI specialist, Dr. Moeller, today.  We will await his 
consultation note for further recommendations.  We will try some Geodon p.o. and
get him off the Haldol and see if we can control some of his agitation and 
confusion, which I think is more Sundowner's at this point.  He has finished all
his antibiotics after 16 days and remains on Lovenox.  I started him on 
Pulmicort.  He remains on prednisone.  We will follow his labs and repeat chest 
x-ray in the morning.  We will continue to titrate his oxygen needs down.  Until
the patient can transition to outpatient management, we will continue to monitor
and treat as needed.  



#[id number]

[facility]

## 2021-02-25 RX ADMIN — BUDESONIDE SCH MG: 0.5 SUSPENSION RESPIRATORY (INHALATION) at 21:00

## 2021-02-25 RX ADMIN — PANTOPRAZOLE SODIUM SCH MG: 40 TABLET, DELAYED RELEASE ORAL at 05:51

## 2021-02-25 RX ADMIN — IPRATROPIUM BROMIDE AND ALBUTEROL SULFATE SCH: .5; 3 SOLUTION RESPIRATORY (INHALATION) at 12:00

## 2021-02-25 RX ADMIN — GUAIFENESIN SCH MG: 600 TABLET, EXTENDED RELEASE ORAL at 20:22

## 2021-02-25 RX ADMIN — ZIPRASIDONE HYDROCHLORIDE SCH MG: 20 CAPSULE ORAL at 07:19

## 2021-02-25 RX ADMIN — ALUMINUM ZIRCONIUM TRICHLOROHYDREX GLY SCH MG: 0.2 STICK TOPICAL at 08:14

## 2021-02-25 RX ADMIN — ZIPRASIDONE HYDROCHLORIDE SCH MG: 20 CAPSULE ORAL at 17:06

## 2021-02-25 RX ADMIN — DIPHENHYDRAMINE HYDROCHLORIDE PRN ML: 12.5 LIQUID ORAL at 09:30

## 2021-02-25 RX ADMIN — IPRATROPIUM BROMIDE AND ALBUTEROL SULFATE SCH ML: .5; 3 SOLUTION RESPIRATORY (INHALATION) at 15:54

## 2021-02-25 RX ADMIN — IPRATROPIUM BROMIDE AND ALBUTEROL SULFATE SCH ML: .5; 3 SOLUTION RESPIRATORY (INHALATION) at 07:26

## 2021-02-25 RX ADMIN — ENOXAPARIN SODIUM SCH MG: 40 INJECTION, SOLUTION INTRAVENOUS; SUBCUTANEOUS at 20:22

## 2021-02-25 RX ADMIN — IPRATROPIUM BROMIDE AND ALBUTEROL SULFATE SCH ML: .5; 3 SOLUTION RESPIRATORY (INHALATION) at 21:00

## 2021-02-25 RX ADMIN — GUAIFENESIN SCH MG: 600 TABLET, EXTENDED RELEASE ORAL at 08:14

## 2021-02-25 RX ADMIN — BUDESONIDE SCH MG: 0.5 SUSPENSION RESPIRATORY (INHALATION) at 07:26

## 2021-02-25 NOTE — RAD
EXAM DESCRIPTION:

X Ray Chest,1 View



CLINICAL HISTORY:

75 years Male, covid



COMPARISON: 2/23/2021.



FINDINGS:

Low lung volumes accentuating the cardiomediastinal silhouette

and central vasculature. Diffuse left greater than right mixed

airspace interstitial disease again noted. No pneumothorax or

pleural effusion. 



IMPRESSION:

Persistent and grossly unchanged diffuse left greater than right

bilateral mixed airspace interstitial disease.



Electronically signed by:  Giacomo Moss MD  2/25/2021 7:15 AM

[facility] Workstation: [id number]

## 2021-02-26 VITALS — TEMPERATURE: 98.4 F | OXYGEN SATURATION: 96 % | SYSTOLIC BLOOD PRESSURE: 109 MMHG | DIASTOLIC BLOOD PRESSURE: 68 MMHG

## 2021-02-26 PROCEDURE — 0BH17EZ INSERTION OF ENDOTRACHEAL AIRWAY INTO TRACHEA, VIA NATURAL OR ARTIFICIAL OPENING: ICD-10-PCS | Performed by: NURSE PRACTITIONER

## 2021-02-26 PROCEDURE — 5A1935Z RESPIRATORY VENTILATION, LESS THAN 24 CONSECUTIVE HOURS: ICD-10-PCS | Performed by: NURSE PRACTITIONER

## 2021-02-26 RX ADMIN — PANTOPRAZOLE SODIUM SCH: 40 TABLET, DELAYED RELEASE ORAL at 06:16

## 2021-02-26 NOTE — PN
SUPERVISING PHYSICIAN:  Harry Liang MD



DATE:  02/25/21



SUBJECTIVE:  The patient has not made much change other than getting a little 
bit more depressed.  He has voiced to the nurses that he is getting to the point
where he has given up.  We did discuss with him getting a counselor in to visit 
with him.  He has had no further complaints.  He has actually been up to a 
chair, but is still requiring high flow nasal cannula on the Airvo system.  



OBJECTIVE:  

VITAL SIGNS:  Temperature 98.1, pulse 99, blood pressure 106/70, respirations 
23-24, saturation 94% on 80% FiO2 on Airvo flow system.

GENERAL:  At the time of my exam, the patient is resting comfortably and is 
cooperative.  He was alert, but confused on location.

RESPIRATORY:  Diminished throughout with some notable rhonchi heard in the left 
upper lobe, no wheezing.

CARDIAC:  Regular rate and rhythm.

GASTROINTESTINAL:  Abdomen is soft, nontender.  Bowel sounds are positive.

EXTREMITIES:  No cyanosis, clubbing or edema.

NEUROLOGIC:  He is alert, but not oriented to location with no obvious 
neurologic deficits noted on exam.

SKIN:  Warm, pink and dry.



LABORATORY:  White count 17,300, stable hemoglobin 16.5 and hematocrit 49.4, 
platelet count 103,000.  Differential continues to show a left shift.  
Coagulation studies show D-dimer 791.  Chemistries show sodium 133, potassium 
5.2.  Creatinine remains within normal limits at 0.6.  Calcium 9.2, total 
bilirubin remains fairly stable and actually showing a slight decrease at 4.4.  
Otherwise, AST, ALT are normal.  



RADIOLOGY:  Chest x-ray this morning shows persistent and grossly unchanged 
diffuse, left greater than right, bilateral mixed airspace and interstitial 
disease.  



ASSESSMENT: 

1.   Acute hypoxemic respiratory failure.

2.   COVID-19 pneumonitis.

3.   Bilateral bacterial pneumonia. Treated with two weeks of IV antibiotics

4.   Hyperbilirubinemia,

5.   Questionable cholecystitis with suspected dilatation of common bile duct 
with an

      elevated indirect bilirubin.

6.   Liver ascites in a patient that is a heavy drinker.

7.   Hospital psychosis. 

8.   Hypertension.



PLAN:  We will continue current plan of care.  I did discuss with him getting 
Amber down to come talk to him in regards to his increasing depression.  I was 
going to start him on an antidepressant, but now he is on Geodon b.i.d. in 
efforts to keep him from being agitated at times.  Until we get him off that 
medication, I am not sure it is safe to put him on another antidepressant.  We 
will continue to titrate his oxygen down as his needs decrease.  They are still 
working on transfer, however, the hospital they were working on said they would 
take him once his oxygen needs are down a little bit more than they are now and 
much more stable, therefore, I am feeling like that is a way of saying no 
without actually giving an answer of no, but we will keep trying to get him 
transferred and discussed with family.  Until we can get him transferred or 
discharged to outpatient management, we will continue to monitor and treat as 
needed.  



#[id number]

[facility]

## 2021-02-26 NOTE — RAD
EXAM: Chest Radiography



COMPARISON: Chest radiograph February 25, 2021



INDICATION: MAIN ETT-check placement



FINDINGS: A single AP view of the chest demonstrates an

indistinct cardiomediastinal silhouette. The endotracheal tube

tip is 1.0 cm from the myesha.



No definite pneumothorax. Probable small left pleural effusion

(the left costophrenic sulcus is out of the field-of-view).

Stable diffuse consolidations, greatest throughout the left lung.



Osseous structures are intact.



IMPRESSION:

1. Endotracheal tube tip is 1.0 cm from the myesha. Consider

retracting 1 to 2 cm.

2. The chest is otherwise stable.



Electronically signed by:  Charan Kelly MD  2/26/2021 7:44 AM [facility]

Workstation: [id number]

## 2021-03-02 NOTE — DS
SUPERVISING PHYSICIAN:  Harry Liang MD



ADMISSION DIAGNOSIS:

1.  COVID pneumonitis with associated hypoxia.

2.  Sepsis secondary to #1 with initial elevated lactic acid.

3.  Hypertension.



DISCHARGE DIAGNOSIS:

1.   Acute hypoxemic respiratory failure requiring mechanical ventilation.

2.   COVID-19 pneumonitis.

3.   Bilateral bacterial pneumonia. Treated with two weeks of IV antibiotics

4.   Hyperbilirubinemia,

5.   Questionable cholecystitis with suspected dilatation of common bile duct 
with an

      elevated indirect bilirubin.

6.   Liver ascites in a patient that is a heavy drinker.

7.   Hospital psychosis. 

8.   Hypertension.



REASON FOR HOSPITALIZATION:  Mr. Kim is a 75 year-old male patient who 
presented to the Emergency Room today with a history of hypertension and 
recently being tested positive for COVID.  He presents with increasing shortness
of breath.  He endorses that his symptoms initially started with a cough and 
congestion on 01/28/21.  He actually tested positive on 1/31/21 and was started 
on a Z-Pac at that time.  He also endorses that he has had body aches, fatigue, 
decreased appetite and occasional fever and increasing difficulty with breathing
over the last 2 days.  He denied actual chest pain.  His chest x-ray shows 
bilateral atelectasis.  His actual labs showed he had a white count of 7,300 
without a left shift but low lymphocytic count.  His D-dimer was normal but he 
had a lactic acid of 2.8.  Creatinine was 0.84.  C-reactive protein is a little 
low at 6.1. Vital signs in the Emergency Room showing saturation 88% on room 
air.  He does not normally wear oxygen.  Respirations of 24, some mild distress.
 After breathing treatment and placing him on oxygen at nasal cannula, he was 
showing 94% saturations.  Given his recent testing positive for COVID and 
increasing shortness of breath and dyspnea, hypoxia, he is going to be admitted 
now for treatment of COVID pneumonitis.  He was admitted in stable condition.



LABORATORY:  White count on discharge was 19,000, hemoglobin 16.3, hematocrit 
49.0.  Differential did show a left shift.  Platelet count 95,000.  Coagulation 
studies showed D-dimer 791.  Blood gas analysis before intubation showed pH 
7.497, pO2 52, pCO2 35, bicarb 27, base excess 4, saturation 90% on 100% FiO2 on
BiPAP.  Chemistry at discharge showed sodium 133, potassium 5.3, BUN 19, 
creatinine 0.59, total bilirubin 4.7, AST 50, ALT 52, last C-reactive protein 
5.1.  Hepatitis panel A, B, C, please that report, reported as all negative.



MICROBIOLOGY:  Blood cultures after 5 days were negative.  



RADIOLOGY:  Chest x-ray on discharge showed endotracheal tube 1.0 cm from the 
myesha, consider retracting 1 to 2 cm.  Chest otherwise stable with no definite 
pneumothorax, probable small left pleural effusion, stable diffuse 
consolidations greatest throughout the left lung.  He had multiple chest x-rays 
while in the hospital.  Please see those reports for details.  He also had 
abdominopelvic CT with contrast that showed moderate upper abdominal ascites, 
nonspecific, with no significant biliary dilation.  There is some moderate 
diffuse colon diverticulosis without focal CT findings of acute diverticulitis. 
He also had an abdominal ultrasound that showed suboptimal evaluation with 
gallbladder filled with sludge, common bile duct not visualized, no intrahepatic
duct dilation.  



EKG:  EKG on admission showed normal sinus rhythm without ST or T-wave 
inversions or ST elevations to indicate acute ischemia.



CONSULTATIONS:  

1.  Medical consultation by Dr. Chase, surgery, please see his report for 
details. 

2.  Medical consultation by Dr. Ferdinand Moeller, GI specialty, please see his 
consultation

     for details.  



HOSPITAL COURSE:  Mr. Kim was admitted initially on 02/07/21 for COVID 
pneumonitis with resulting pneumonia.  He was treated with remdesivir, Rocephin,
azithromycin, Decadron and then transitioned to Levaquin for total treatment of 
16 days on antibiotics.  He was continued on p.o. prednisone.  He had aggressive
pulmonary hygiene, nebulizer treatments with albuterol and Pulmicort.  He was 
slow to progress in his clinical presentation related to COVID 
pneumonitis/pneumonia.  On the morning of the day of transfer, 02/26/21, the 
patient acutely had decline in his clinical condition requiring respiratory 
support in the form of mechanical ventilation.  Please see nursing notes and 
other notes for details regarding intubation.  The patient was found to be 
stable at time of intubation at discharge.  He also had multiple consultations 
regarding his elevated bilirubin, but no acute findings were noted.  Ultimately,
the patient had to be transferred to higher level of care for ICU related to his
intubation and services not available at [facility].  He was
transported via ground ambulance.  Vital signs on discharge showed he had 
temperature 98.4, pulse 111, blood pressure 109/68, respirations 22 via 
mechanical ventilation and saturation 96% on 100% FiO2.  



PLAN:  Mr. Kim was transferred to [facility], [place], operated by [facility] after he 
required intubation and stabilization due to worsening pneumonia.  He was 
accepted in transfer by [facility] due to services not available at [facility].  He was serious but stable at time of transfer.  He did
transfer via ground ambulance.  Once he is discharged, he will need followup 
with Dr. Singh.  



Critical care time spent for evaluation and treatment of the patient, 
coordination of care with nursing and respiratory staff, intubation, chart 
review, medication review,  as well as documentation is 45 minutes.  



#[id number]

[facility]